# Patient Record
Sex: FEMALE | Race: BLACK OR AFRICAN AMERICAN | NOT HISPANIC OR LATINO | Employment: FULL TIME
[De-identification: names, ages, dates, MRNs, and addresses within clinical notes are randomized per-mention and may not be internally consistent; named-entity substitution may affect disease eponyms.]

---

## 2018-05-14 ENCOUNTER — CHARTING TRANS (OUTPATIENT)
Dept: OTHER | Age: 38
End: 2018-05-14

## 2018-05-14 ASSESSMENT — PAIN SCALES - GENERAL: PAINLEVEL_OUTOF10: 3

## 2018-08-01 ENCOUNTER — IMAGING SERVICES (OUTPATIENT)
Dept: OTHER | Age: 38
End: 2018-08-01

## 2018-09-13 ENCOUNTER — ANCILLARY ORDERS (OUTPATIENT)
Dept: OTHER | Age: 38
End: 2018-09-13

## 2018-09-13 ENCOUNTER — CHARTING TRANS (OUTPATIENT)
Dept: OTHER | Age: 38
End: 2018-09-13

## 2018-09-13 DIAGNOSIS — M54.16 RADICULOPATHY OF LUMBAR REGION: ICD-10-CM

## 2018-09-14 ENCOUNTER — CHARTING TRANS (OUTPATIENT)
Dept: OTHER | Age: 38
End: 2018-09-14

## 2018-11-01 VITALS
TEMPERATURE: 99.6 F | HEART RATE: 100 BPM | BODY MASS INDEX: 35.61 KG/M2 | WEIGHT: 200.99 LBS | HEIGHT: 63 IN | RESPIRATION RATE: 18 BRPM

## 2018-11-23 ENCOUNTER — IMAGING SERVICES (OUTPATIENT)
Dept: OTHER | Age: 38
End: 2018-11-23

## 2018-12-01 DIAGNOSIS — I83.892 VARICOSE VEINS OF LEFT LEG WITH EDEMA: Primary | ICD-10-CM

## 2018-12-14 ENCOUNTER — APPOINTMENT (OUTPATIENT)
Dept: CARDIOLOGY | Age: 38
End: 2018-12-14
Attending: INTERNAL MEDICINE

## 2018-12-15 ENCOUNTER — TELEPHONE (OUTPATIENT)
Dept: CARDIOLOGY | Age: 38
End: 2018-12-15

## 2018-12-21 ENCOUNTER — TELEPHONE (OUTPATIENT)
Dept: CARDIOLOGY | Age: 38
End: 2018-12-21

## 2018-12-24 ENCOUNTER — APPOINTMENT (OUTPATIENT)
Dept: CARDIOLOGY | Age: 38
End: 2018-12-24
Attending: INTERNAL MEDICINE

## 2018-12-26 ENCOUNTER — ANCILLARY PROCEDURE (OUTPATIENT)
Dept: CARDIOLOGY | Age: 38
End: 2018-12-26
Attending: INTERNAL MEDICINE

## 2018-12-26 DIAGNOSIS — I83.892 VARICOSE VEINS OF LEFT LEG WITH EDEMA: ICD-10-CM

## 2018-12-26 PROCEDURE — 93971 EXTREMITY STUDY: CPT | Performed by: SURGERY

## 2019-01-10 ENCOUNTER — TELEPHONE (OUTPATIENT)
Dept: CARDIOLOGY | Age: 39
End: 2019-01-10

## 2019-02-12 RX ORDER — AMOXICILLIN 250 MG
CAPSULE ORAL
COMMUNITY
Start: 2018-11-09 | End: 2019-02-15 | Stop reason: ALTCHOICE

## 2019-02-12 RX ORDER — MULTIVITAMIN
CAPSULE ORAL
COMMUNITY
End: 2019-02-15 | Stop reason: ALTCHOICE

## 2019-02-12 RX ORDER — VALACYCLOVIR HYDROCHLORIDE 1 G/1
TABLET, FILM COATED ORAL EVERY 12 HOURS SCHEDULED
COMMUNITY
Start: 2018-05-14 | End: 2019-02-15 | Stop reason: ALTCHOICE

## 2019-02-12 RX ORDER — ACETAMINOPHEN AND CODEINE PHOSPHATE 120; 12 MG/5ML; MG/5ML
SOLUTION ORAL
COMMUNITY
Start: 2018-11-09 | End: 2019-02-15 | Stop reason: ALTCHOICE

## 2019-02-12 RX ORDER — LABETALOL 100 MG/1
100 TABLET, FILM COATED ORAL
COMMUNITY
End: 2019-02-15 | Stop reason: ALTCHOICE

## 2019-02-15 ENCOUNTER — OFFICE VISIT (OUTPATIENT)
Dept: CARDIOLOGY | Age: 39
End: 2019-02-15

## 2019-02-15 VITALS
WEIGHT: 178.8 LBS | BODY MASS INDEX: 31.68 KG/M2 | SYSTOLIC BLOOD PRESSURE: 122 MMHG | DIASTOLIC BLOOD PRESSURE: 80 MMHG | RESPIRATION RATE: 16 BRPM | OXYGEN SATURATION: 99 % | HEIGHT: 63 IN | HEART RATE: 80 BPM

## 2019-02-15 DIAGNOSIS — I83.812 VARICOSE VEINS OF LEFT LOWER EXTREMITY WITH PAIN: Primary | ICD-10-CM

## 2019-02-15 DIAGNOSIS — I87.2 VENOUS INSUFFICIENCY: ICD-10-CM

## 2019-02-15 PROCEDURE — 99214 OFFICE O/P EST MOD 30 MIN: CPT | Performed by: INTERNAL MEDICINE

## 2019-02-19 PROBLEM — I83.812 VARICOSE VEINS OF LEFT LOWER EXTREMITY WITH PAIN: Status: ACTIVE | Noted: 2019-02-19

## 2019-02-19 PROBLEM — I87.2 VENOUS INSUFFICIENCY: Status: ACTIVE | Noted: 2019-02-19

## 2019-02-25 ENCOUNTER — TELEPHONE (OUTPATIENT)
Dept: CARDIOLOGY | Age: 39
End: 2019-02-25

## 2019-03-05 VITALS
BODY MASS INDEX: 33.31 KG/M2 | WEIGHT: 188 LBS | TEMPERATURE: 97.7 F | DIASTOLIC BLOOD PRESSURE: 70 MMHG | HEIGHT: 63 IN | SYSTOLIC BLOOD PRESSURE: 124 MMHG | HEART RATE: 92 BPM

## 2019-03-11 ENCOUNTER — ANCILLARY PROCEDURE (OUTPATIENT)
Dept: CARDIOLOGY | Age: 39
End: 2019-03-11
Attending: INTERNAL MEDICINE

## 2019-03-11 VITALS
BODY MASS INDEX: 32.43 KG/M2 | DIASTOLIC BLOOD PRESSURE: 83 MMHG | HEIGHT: 63 IN | HEART RATE: 83 BPM | OXYGEN SATURATION: 100 % | RESPIRATION RATE: 16 BRPM | SYSTOLIC BLOOD PRESSURE: 152 MMHG | WEIGHT: 183 LBS | TEMPERATURE: 98.8 F

## 2019-03-11 DIAGNOSIS — I87.2 VENOUS INSUFFICIENCY: ICD-10-CM

## 2019-03-11 PROCEDURE — 36475 ENDOVENOUS RF 1ST VEIN: CPT | Performed by: INTERNAL MEDICINE

## 2019-03-11 PROCEDURE — 96374 THER/PROPH/DIAG INJ IV PUSH: CPT | Performed by: INTERNAL MEDICINE

## 2019-03-11 RX ORDER — DIAZEPAM 2 MG/1
10 TABLET ORAL ONCE
Status: COMPLETED | OUTPATIENT
Start: 2019-03-11 | End: 2019-03-11

## 2019-03-11 RX ADMIN — DIAZEPAM 10 MG: 2 TABLET ORAL at 11:30

## 2019-03-11 RX ADMIN — Medication 50 MCG: at 11:50

## 2019-03-14 ENCOUNTER — ANCILLARY PROCEDURE (OUTPATIENT)
Dept: CARDIOLOGY | Age: 39
End: 2019-03-14
Attending: INTERNAL MEDICINE

## 2019-03-14 DIAGNOSIS — I87.2 VENOUS INSUFFICIENCY: ICD-10-CM

## 2019-03-29 ENCOUNTER — APPOINTMENT (OUTPATIENT)
Dept: CARDIOLOGY | Age: 39
End: 2019-03-29
Attending: INTERNAL MEDICINE

## 2019-03-29 VITALS
HEIGHT: 63 IN | DIASTOLIC BLOOD PRESSURE: 78 MMHG | WEIGHT: 178 LBS | OXYGEN SATURATION: 99 % | HEART RATE: 96 BPM | SYSTOLIC BLOOD PRESSURE: 132 MMHG | BODY MASS INDEX: 31.54 KG/M2 | RESPIRATION RATE: 16 BRPM

## 2019-03-29 DIAGNOSIS — I87.2 PERIPHERAL VENOUS INSUFFICIENCY: ICD-10-CM

## 2019-03-29 PROCEDURE — 36471 NJX SCLRSNT MLT INCMPTNT VN: CPT | Performed by: INTERNAL MEDICINE

## 2019-06-06 ENCOUNTER — TELEPHONE (OUTPATIENT)
Dept: INTERNAL MEDICINE | Age: 39
End: 2019-06-06

## 2019-06-06 DIAGNOSIS — B00.1 HERPES SIMPLEX LABIALIS: Primary | ICD-10-CM

## 2019-06-06 RX ORDER — VALACYCLOVIR HYDROCHLORIDE 1 G/1
TABLET, FILM COATED ORAL
Qty: 20 TABLET | Refills: 0 | Status: SHIPPED | OUTPATIENT
Start: 2019-06-06 | End: 2019-06-18 | Stop reason: ALTCHOICE

## 2019-06-08 ENCOUNTER — LAB SERVICES (OUTPATIENT)
Dept: FAMILY MEDICINE | Age: 39
End: 2019-06-08

## 2019-06-18 ENCOUNTER — OFFICE VISIT (OUTPATIENT)
Dept: CARDIOLOGY | Age: 39
End: 2019-06-18

## 2019-06-18 VITALS
WEIGHT: 180.2 LBS | OXYGEN SATURATION: 98 % | SYSTOLIC BLOOD PRESSURE: 110 MMHG | DIASTOLIC BLOOD PRESSURE: 83 MMHG | HEIGHT: 63 IN | BODY MASS INDEX: 31.93 KG/M2 | HEART RATE: 79 BPM | RESPIRATION RATE: 16 BRPM

## 2019-06-18 DIAGNOSIS — I83.812 VARICOSE VEINS OF LEFT LOWER EXTREMITY WITH PAIN: Primary | ICD-10-CM

## 2019-06-18 DIAGNOSIS — I87.2 VENOUS INSUFFICIENCY: ICD-10-CM

## 2019-06-18 PROCEDURE — 99213 OFFICE O/P EST LOW 20 MIN: CPT | Performed by: INTERNAL MEDICINE

## 2020-06-15 ENCOUNTER — TELEPHONE (OUTPATIENT)
Dept: CARDIOLOGY | Age: 40
End: 2020-06-15

## 2020-06-19 ENCOUNTER — APPOINTMENT (OUTPATIENT)
Dept: CARDIOLOGY | Age: 40
End: 2020-06-19

## 2021-09-14 ENCOUNTER — OFFICE VISIT (OUTPATIENT)
Dept: INTERNAL MEDICINE | Age: 41
End: 2021-09-14

## 2021-09-14 VITALS
HEIGHT: 63 IN | OXYGEN SATURATION: 97 % | DIASTOLIC BLOOD PRESSURE: 92 MMHG | BODY MASS INDEX: 39.69 KG/M2 | SYSTOLIC BLOOD PRESSURE: 124 MMHG | HEART RATE: 89 BPM | TEMPERATURE: 97.6 F | WEIGHT: 224 LBS | RESPIRATION RATE: 20 BRPM

## 2021-09-14 DIAGNOSIS — Z12.31 VISIT FOR SCREENING MAMMOGRAM: ICD-10-CM

## 2021-09-14 DIAGNOSIS — Z00.00 ROUTINE GENERAL MEDICAL EXAMINATION AT A HEALTH CARE FACILITY: Primary | ICD-10-CM

## 2021-09-14 DIAGNOSIS — Z83.3 FAMILY HISTORY OF DIABETES MELLITUS: ICD-10-CM

## 2021-09-14 DIAGNOSIS — Z12.12 SCREENING FOR MALIGNANT NEOPLASM OF THE RECTUM: ICD-10-CM

## 2021-09-14 DIAGNOSIS — R07.2 PRECORDIAL PAIN: ICD-10-CM

## 2021-09-14 DIAGNOSIS — R73.01 IFG (IMPAIRED FASTING GLUCOSE): ICD-10-CM

## 2021-09-14 PROBLEM — B00.1 HERPES SIMPLEX LABIALIS: Status: ACTIVE | Noted: 2018-05-14

## 2021-09-14 PROCEDURE — 93000 ELECTROCARDIOGRAM COMPLETE: CPT | Performed by: INTERNAL MEDICINE

## 2021-09-14 PROCEDURE — 99386 PREV VISIT NEW AGE 40-64: CPT | Performed by: INTERNAL MEDICINE

## 2021-09-14 PROCEDURE — 3074F SYST BP LT 130 MM HG: CPT | Performed by: INTERNAL MEDICINE

## 2021-09-14 PROCEDURE — 3080F DIAST BP >= 90 MM HG: CPT | Performed by: INTERNAL MEDICINE

## 2021-09-14 ASSESSMENT — PATIENT HEALTH QUESTIONNAIRE - PHQ9
CLINICAL INTERPRETATION OF PHQ2 SCORE: NO FURTHER SCREENING NEEDED
SUM OF ALL RESPONSES TO PHQ9 QUESTIONS 1 AND 2: 0
SUM OF ALL RESPONSES TO PHQ9 QUESTIONS 1 AND 2: 0
CLINICAL INTERPRETATION OF PHQ9 SCORE: NO FURTHER SCREENING NEEDED
1. LITTLE INTEREST OR PLEASURE IN DOING THINGS: NOT AT ALL
2. FEELING DOWN, DEPRESSED OR HOPELESS: NOT AT ALL

## 2021-09-15 DIAGNOSIS — Z01.812 PRE-PROCEDURAL LABORATORY EXAMINATION: Primary | ICD-10-CM

## 2021-09-20 ENCOUNTER — TELEPHONE (OUTPATIENT)
Dept: INTERNAL MEDICINE | Age: 41
End: 2021-09-20

## 2021-09-20 DIAGNOSIS — B00.1 HERPES SIMPLEX LABIALIS: ICD-10-CM

## 2021-09-22 ENCOUNTER — TELEPHONE (OUTPATIENT)
Dept: INTERNAL MEDICINE | Age: 41
End: 2021-09-22

## 2021-09-22 RX ORDER — VALACYCLOVIR HYDROCHLORIDE 1 G/1
TABLET, FILM COATED ORAL
Qty: 20 TABLET | Refills: 0 | Status: SHIPPED | OUTPATIENT
Start: 2021-09-22 | End: 2022-10-07 | Stop reason: SDUPTHER

## 2021-09-28 ENCOUNTER — LAB SERVICES (OUTPATIENT)
Dept: LAB | Age: 41
End: 2021-09-28

## 2021-09-28 DIAGNOSIS — Z01.812 PRE-PROCEDURAL LABORATORY EXAMINATION: ICD-10-CM

## 2021-09-28 PROCEDURE — U0005 INFEC AGEN DETEC AMPLI PROBE: HCPCS | Performed by: INTERNAL MEDICINE

## 2021-09-28 PROCEDURE — U0003 INFECTIOUS AGENT DETECTION BY NUCLEIC ACID (DNA OR RNA); SEVERE ACUTE RESPIRATORY SYNDROME CORONAVIRUS 2 (SARS-COV-2) (CORONAVIRUS DISEASE [COVID-19]), AMPLIFIED PROBE TECHNIQUE, MAKING USE OF HIGH THROUGHPUT TECHNOLOGIES AS DESCRIBED BY CMS-2020-01-R: HCPCS | Performed by: INTERNAL MEDICINE

## 2021-09-29 LAB
SARS-COV-2 RNA RESP QL NAA+PROBE: NOT DETECTED
SERVICE CMNT-IMP: NORMAL
SERVICE CMNT-IMP: NORMAL

## 2021-09-30 ENCOUNTER — LAB SERVICES (OUTPATIENT)
Dept: LAB | Age: 41
End: 2021-09-30

## 2021-09-30 ENCOUNTER — ANCILLARY PROCEDURE (OUTPATIENT)
Dept: GENERAL RADIOLOGY | Age: 41
End: 2021-09-30
Attending: INTERNAL MEDICINE

## 2021-09-30 ENCOUNTER — ANCILLARY PROCEDURE (OUTPATIENT)
Dept: CARDIOLOGY | Age: 41
End: 2021-09-30
Attending: INTERNAL MEDICINE

## 2021-09-30 DIAGNOSIS — R73.01 IFG (IMPAIRED FASTING GLUCOSE): ICD-10-CM

## 2021-09-30 DIAGNOSIS — Z00.00 ROUTINE GENERAL MEDICAL EXAMINATION AT A HEALTH CARE FACILITY: ICD-10-CM

## 2021-09-30 DIAGNOSIS — Z83.3 FAMILY HISTORY OF DIABETES MELLITUS: ICD-10-CM

## 2021-09-30 DIAGNOSIS — R07.2 PRECORDIAL PAIN: ICD-10-CM

## 2021-09-30 LAB
ALBUMIN SERPL BCG-MCNC: 4.4 G/DL (ref 3.6–5.1)
ALP SERPL-CCNC: 56 U/L (ref 45–130)
ALT SERPL W/O P-5'-P-CCNC: 28 U/L (ref 7–34)
AST SERPL-CCNC: 25 U/L (ref 9–37)
BILIRUB SERPL-MCNC: 0.5 MG/DL (ref 0–1)
BUN SERPL-MCNC: 9 MG/DL (ref 7–20)
CALCIUM SERPL-MCNC: 10.7 MG/DL (ref 8.6–10.6)
CHLORIDE SERPL-SCNC: 100 MMOL/L (ref 96–107)
CHOLEST SERPL-MCNC: 206 MG/DL (ref 140–200)
CREAT SERPL-MCNC: 0.7 MG/DL (ref 0.5–1.4)
EST. AVERAGE GLUCOSE BLD GHB EST-MCNC: 135 MG/DL (ref 0–154)
GFR SERPL CREATININE-BSD FRML MDRD: >60 ML/MIN/{1.73M2}
GFR SERPL CREATININE-BSD FRML MDRD: >60 ML/MIN/{1.73M2}
GLUCOSE P FAST SERPL-MCNC: 107 MG/DL (ref 60–100)
HBA1C MFR BLD: 6.3 % (ref 4.2–6)
HCO3 SERPL-SCNC: 23 MMOL/L (ref 22–32)
HDLC SERPL-MCNC: 39 MG/DL
LDLC SERPL CALC-MCNC: 138 MG/DL (ref 0–100)
POTASSIUM SERPL-SCNC: 4.6 MMOL/L (ref 3.5–5.3)
PROT SERPL-MCNC: 7.2 G/DL (ref 6.2–8.1)
SODIUM SERPL-SCNC: 135 MMOL/L (ref 136–146)
TRIGL SERPL-MCNC: 143 MG/DL (ref 0–200)
TSH SERPL DL<=0.05 MIU/L-ACNC: 1.15 M[IU]/L (ref 0.3–4.82)

## 2021-09-30 PROCEDURE — X1094 NO CHARGE VISIT: HCPCS

## 2021-09-30 PROCEDURE — 80053 COMPREHEN METABOLIC PANEL: CPT | Performed by: INTERNAL MEDICINE

## 2021-09-30 PROCEDURE — 84443 ASSAY THYROID STIM HORMONE: CPT | Performed by: INTERNAL MEDICINE

## 2021-09-30 PROCEDURE — 78452 HT MUSCLE IMAGE SPECT MULT: CPT | Performed by: INTERNAL MEDICINE

## 2021-09-30 PROCEDURE — 36415 COLL VENOUS BLD VENIPUNCTURE: CPT | Performed by: INTERNAL MEDICINE

## 2021-09-30 PROCEDURE — 93015 CV STRESS TEST SUPVJ I&R: CPT | Performed by: INTERNAL MEDICINE

## 2021-09-30 PROCEDURE — A9500 TC99M SESTAMIBI: HCPCS | Performed by: INTERNAL MEDICINE

## 2021-09-30 PROCEDURE — G1004 CDSM NDSC: HCPCS | Performed by: INTERNAL MEDICINE

## 2021-09-30 PROCEDURE — 80061 LIPID PANEL: CPT | Performed by: INTERNAL MEDICINE

## 2021-09-30 PROCEDURE — 83036 HEMOGLOBIN GLYCOSYLATED A1C: CPT | Performed by: INTERNAL MEDICINE

## 2021-09-30 RX ORDER — TETRAKIS(2-METHOXYISOBUTYLISOCYANIDE)COPPER(I) TETRAFLUOROBORATE 1 MG/ML
30 INJECTION, POWDER, LYOPHILIZED, FOR SOLUTION INTRAVENOUS ONCE
Status: COMPLETED | OUTPATIENT
Start: 2021-09-30 | End: 2021-09-30

## 2021-09-30 RX ORDER — TETRAKIS(2-METHOXYISOBUTYLISOCYANIDE)COPPER(I) TETRAFLUOROBORATE 1 MG/ML
11 INJECTION, POWDER, LYOPHILIZED, FOR SOLUTION INTRAVENOUS ONCE
Status: COMPLETED | OUTPATIENT
Start: 2021-09-30 | End: 2021-09-30

## 2021-09-30 RX ADMIN — TETRAKIS(2-METHOXYISOBUTYLISOCYANIDE)COPPER(I) TETRAFLUOROBORATE 33 MILLICURIE: 1 INJECTION, POWDER, LYOPHILIZED, FOR SOLUTION INTRAVENOUS at 11:28

## 2021-09-30 RX ADMIN — TETRAKIS(2-METHOXYISOBUTYLISOCYANIDE)COPPER(I) TETRAFLUOROBORATE 11 MILLICURIE: 1 INJECTION, POWDER, LYOPHILIZED, FOR SOLUTION INTRAVENOUS at 09:45

## 2021-10-01 ENCOUNTER — TELEPHONE (OUTPATIENT)
Dept: CARDIOLOGY | Age: 41
End: 2021-10-01

## 2021-10-01 LAB — STRESS TARGET HR: 177 BPM

## 2021-10-06 ENCOUNTER — TELEPHONE (OUTPATIENT)
Dept: NUTRITION | Age: 41
End: 2021-10-06

## 2021-10-06 DIAGNOSIS — E78.5 ELEVATED LIPIDS: ICD-10-CM

## 2021-10-06 DIAGNOSIS — R73.01 IFG (IMPAIRED FASTING GLUCOSE): Primary | ICD-10-CM

## 2021-10-06 DIAGNOSIS — Z83.3 FAMILY HISTORY OF DIABETES MELLITUS: ICD-10-CM

## 2021-10-06 DIAGNOSIS — E78.5 HYPERLIPIDEMIA, UNSPECIFIED HYPERLIPIDEMIA TYPE: Primary | ICD-10-CM

## 2021-10-06 DIAGNOSIS — R73.01 IMPAIRED FASTING BLOOD SUGAR: ICD-10-CM

## 2021-10-11 ENCOUNTER — OFFICE VISIT (OUTPATIENT)
Dept: NUTRITION | Age: 41
End: 2021-10-11

## 2021-10-11 DIAGNOSIS — R73.03 PRE-DIABETES: Primary | ICD-10-CM

## 2021-10-11 PROCEDURE — 97802 MEDICAL NUTRITION INDIV IN: CPT | Performed by: DIETITIAN, REGISTERED

## 2022-01-06 ENCOUNTER — HOSPITAL ENCOUNTER (EMERGENCY)
Age: 42
Discharge: HOME | End: 2022-01-07
Attending: EMERGENCY MEDICINE
Payer: COMMERCIAL

## 2022-01-06 ENCOUNTER — APPOINTMENT (OUTPATIENT)
Dept: CT IMAGING | Age: 42
End: 2022-01-06
Attending: EMERGENCY MEDICINE
Payer: COMMERCIAL

## 2022-01-06 ENCOUNTER — APPOINTMENT (OUTPATIENT)
Dept: GENERAL RADIOLOGY | Age: 42
End: 2022-01-06
Attending: EMERGENCY MEDICINE
Payer: COMMERCIAL

## 2022-01-06 VITALS
HEIGHT: 63 IN | BODY MASS INDEX: 37.21 KG/M2 | DIASTOLIC BLOOD PRESSURE: 88 MMHG | SYSTOLIC BLOOD PRESSURE: 137 MMHG | WEIGHT: 210 LBS | RESPIRATION RATE: 18 BRPM | TEMPERATURE: 98 F | HEART RATE: 95 BPM | OXYGEN SATURATION: 99 %

## 2022-01-06 DIAGNOSIS — N63.0 BREAST NODULE: ICD-10-CM

## 2022-01-06 DIAGNOSIS — R07.9 CHEST PAIN OF UNCERTAIN ETIOLOGY: Primary | ICD-10-CM

## 2022-01-06 LAB
ALBUMIN SERPL-MCNC: 4.1 G/DL (ref 3.4–5)
ALBUMIN/GLOB SERPL: 1 {RATIO} (ref 1–2)
ALP LIVER SERPL-CCNC: 60 U/L
ALT SERPL-CCNC: 60 U/L
ANION GAP SERPL CALC-SCNC: 6 MMOL/L (ref 0–18)
AST SERPL-CCNC: 40 U/L (ref 15–37)
BASOPHILS # BLD AUTO: 0.03 X10(3) UL (ref 0–0.2)
BASOPHILS NFR BLD AUTO: 0.5 %
BILIRUB SERPL-MCNC: 0.4 MG/DL (ref 0.1–2)
BUN BLD-MCNC: 9 MG/DL (ref 7–18)
CALCIUM BLD-MCNC: 9.6 MG/DL (ref 8.5–10.1)
CHLORIDE SERPL-SCNC: 105 MMOL/L (ref 98–112)
CO2 SERPL-SCNC: 26 MMOL/L (ref 21–32)
CREAT BLD-MCNC: 0.79 MG/DL
D DIMER PPP FEU-MCNC: 0.59 UG/ML FEU (ref ?–0.5)
EOSINOPHIL # BLD AUTO: 0.13 X10(3) UL (ref 0–0.7)
EOSINOPHIL NFR BLD AUTO: 2.4 %
ERYTHROCYTE [DISTWIDTH] IN BLOOD BY AUTOMATED COUNT: 12 %
GLOBULIN PLAS-MCNC: 4 G/DL (ref 2.8–4.4)
GLUCOSE BLD-MCNC: 130 MG/DL (ref 70–99)
HCT VFR BLD AUTO: 44.5 %
HGB BLD-MCNC: 15.4 G/DL
IMM GRANULOCYTES # BLD AUTO: 0.02 X10(3) UL (ref 0–1)
IMM GRANULOCYTES NFR BLD: 0.4 %
LYMPHOCYTES # BLD AUTO: 3.3 X10(3) UL (ref 1–4)
LYMPHOCYTES NFR BLD AUTO: 59.7 %
MCH RBC QN AUTO: 32.1 PG (ref 26–34)
MCHC RBC AUTO-ENTMCNC: 34.6 G/DL (ref 31–37)
MCV RBC AUTO: 92.7 FL
MONOCYTES # BLD AUTO: 0.29 X10(3) UL (ref 0.1–1)
MONOCYTES NFR BLD AUTO: 5.2 %
NEUTROPHILS # BLD AUTO: 1.76 X10 (3) UL (ref 1.5–7.7)
NEUTROPHILS # BLD AUTO: 1.76 X10(3) UL (ref 1.5–7.7)
NEUTROPHILS NFR BLD AUTO: 31.8 %
OSMOLALITY SERPL CALC.SUM OF ELEC: 284 MOSM/KG (ref 275–295)
PLATELET # BLD AUTO: 266 10(3)UL (ref 150–450)
POTASSIUM SERPL-SCNC: 4.2 MMOL/L (ref 3.5–5.1)
PROT SERPL-MCNC: 8.1 G/DL (ref 6.4–8.2)
RBC # BLD AUTO: 4.8 X10(6)UL
SARS-COV-2 RNA RESP QL NAA+PROBE: NOT DETECTED
SODIUM SERPL-SCNC: 137 MMOL/L (ref 136–145)
TROPONIN I HIGH SENSITIVITY: <3 NG/L
TROPONIN I HIGH SENSITIVITY: <3 NG/L
WBC # BLD AUTO: 5.5 X10(3) UL (ref 4–11)

## 2022-01-06 PROCEDURE — 93010 ELECTROCARDIOGRAM REPORT: CPT

## 2022-01-06 PROCEDURE — 85025 COMPLETE CBC W/AUTO DIFF WBC: CPT | Performed by: EMERGENCY MEDICINE

## 2022-01-06 PROCEDURE — 85379 FIBRIN DEGRADATION QUANT: CPT | Performed by: EMERGENCY MEDICINE

## 2022-01-06 PROCEDURE — 71260 CT THORAX DX C+: CPT | Performed by: EMERGENCY MEDICINE

## 2022-01-06 PROCEDURE — 99284 EMERGENCY DEPT VISIT MOD MDM: CPT

## 2022-01-06 PROCEDURE — 99285 EMERGENCY DEPT VISIT HI MDM: CPT

## 2022-01-06 PROCEDURE — 80053 COMPREHEN METABOLIC PANEL: CPT | Performed by: EMERGENCY MEDICINE

## 2022-01-06 PROCEDURE — 93005 ELECTROCARDIOGRAM TRACING: CPT

## 2022-01-06 PROCEDURE — 84484 ASSAY OF TROPONIN QUANT: CPT | Performed by: EMERGENCY MEDICINE

## 2022-01-06 PROCEDURE — 96374 THER/PROPH/DIAG INJ IV PUSH: CPT

## 2022-01-06 PROCEDURE — 71045 X-RAY EXAM CHEST 1 VIEW: CPT | Performed by: EMERGENCY MEDICINE

## 2022-01-06 RX ORDER — KETOROLAC TROMETHAMINE 15 MG/ML
15 INJECTION, SOLUTION INTRAMUSCULAR; INTRAVENOUS ONCE
Status: COMPLETED | OUTPATIENT
Start: 2022-01-06 | End: 2022-01-06

## 2022-01-07 ENCOUNTER — TELEPHONE (OUTPATIENT)
Dept: INTERNAL MEDICINE | Age: 42
End: 2022-01-07

## 2022-01-07 LAB
ATRIAL RATE: 107 BPM
P AXIS: 64 DEGREES
P-R INTERVAL: 142 MS
Q-T INTERVAL: 344 MS
QRS DURATION: 74 MS
QTC CALCULATION (BEZET): 459 MS
R AXIS: 59 DEGREES
T AXIS: 52 DEGREES
VENTRICULAR RATE: 107 BPM

## 2022-01-07 NOTE — ED INITIAL ASSESSMENT (HPI)
Chest pain started approx 45 mins ago. Pt states worse with deep breathing. Denies n/v, light headed or dizziness, or diaphoresis. States just getting over a cold for 5 days.

## 2022-01-07 NOTE — ED PROVIDER NOTES
Patient Seen in: THE [facility] Emergency Department In [place]      History   Patient presents with:  Chest Pain Angina    Stated Complaint: chest pain    Subjective:   HPI    This is a 52-year-old woman, reports history of hypertension, here for evaluation and nursing note reviewed. General: Well-appearing younger woman in no acute distress  Head: Normocephalic and atraumatic. HEENT:  Mucous membranes are moist.   Cardiovascular:  Normal rate and regular rhythm.   No Edema  Pulmonary:  Pulmonary effort is shares she has been having chest pain for the last hour. Pain increases when she takes a deep breath. FINDINGS:  Heart size and vascularity are normal.  Lung fields are clear. No diaphragmatic or pleural abnormality is identified.   The ignacio and mediast with constant sharp central chest discomfort, nonexertional starting today. EKG unremarkable troponin negative x2, D-dimer was positive leading to a CTA of the chest that showed no acute abnormalities did demonstrate an 8 mm breast nodule.   Patient alread

## 2022-01-11 ENCOUNTER — APPOINTMENT (OUTPATIENT)
Dept: INTERNAL MEDICINE | Age: 42
End: 2022-01-11

## 2022-01-13 ENCOUNTER — OFFICE VISIT (OUTPATIENT)
Dept: INTERNAL MEDICINE | Age: 42
End: 2022-01-13

## 2022-01-13 ENCOUNTER — IMAGING SERVICES (OUTPATIENT)
Dept: GENERAL RADIOLOGY | Age: 42
End: 2022-01-13
Attending: INTERNAL MEDICINE

## 2022-01-13 ENCOUNTER — TELEPHONE (OUTPATIENT)
Dept: INTERNAL MEDICINE | Age: 42
End: 2022-01-13

## 2022-01-13 VITALS
SYSTOLIC BLOOD PRESSURE: 132 MMHG | HEART RATE: 104 BPM | BODY MASS INDEX: 38.62 KG/M2 | WEIGHT: 218 LBS | HEIGHT: 63 IN | OXYGEN SATURATION: 98 % | DIASTOLIC BLOOD PRESSURE: 92 MMHG | RESPIRATION RATE: 20 BRPM | TEMPERATURE: 99.3 F

## 2022-01-13 DIAGNOSIS — R05.8 COUGH PRODUCTIVE OF PURULENT SPUTUM: ICD-10-CM

## 2022-01-13 DIAGNOSIS — R93.6 ABNORMAL X-RAY OF KNEE: Primary | ICD-10-CM

## 2022-01-13 DIAGNOSIS — M25.561 RECURRENT PAIN OF RIGHT KNEE: ICD-10-CM

## 2022-01-13 DIAGNOSIS — K76.0 FATTY INFILTRATION OF LIVER: ICD-10-CM

## 2022-01-13 DIAGNOSIS — R07.9 CHEST PAIN AT REST: Primary | ICD-10-CM

## 2022-01-13 DIAGNOSIS — R79.89 POSITIVE D DIMER: ICD-10-CM

## 2022-01-13 DIAGNOSIS — N63.0 BREAST NODULE: ICD-10-CM

## 2022-01-13 PROCEDURE — 3080F DIAST BP >= 90 MM HG: CPT | Performed by: INTERNAL MEDICINE

## 2022-01-13 PROCEDURE — 3075F SYST BP GE 130 - 139MM HG: CPT | Performed by: INTERNAL MEDICINE

## 2022-01-13 PROCEDURE — 73564 X-RAY EXAM KNEE 4 OR MORE: CPT | Performed by: RADIOLOGY

## 2022-01-13 PROCEDURE — 99215 OFFICE O/P EST HI 40 MIN: CPT | Performed by: INTERNAL MEDICINE

## 2022-01-13 RX ORDER — AZITHROMYCIN 250 MG/1
TABLET, FILM COATED ORAL
Qty: 6 TABLET | Refills: 0 | Status: SHIPPED | OUTPATIENT
Start: 2022-01-13 | End: 2022-10-18 | Stop reason: ALTCHOICE

## 2022-01-13 RX ORDER — DEXTROMETHORPHAN HYDROBROMIDE AND PROMETHAZINE HYDROCHLORIDE 15; 6.25 MG/5ML; MG/5ML
5 SYRUP ORAL 4 TIMES DAILY PRN
Qty: 240 ML | Refills: 0 | Status: SHIPPED | OUTPATIENT
Start: 2022-01-13 | End: 2022-10-18 | Stop reason: ALTCHOICE

## 2022-01-13 ASSESSMENT — PAIN SCALES - GENERAL: PAINLEVEL: 0

## 2022-01-14 DIAGNOSIS — M89.30 BONE THICKENING: Primary | ICD-10-CM

## 2022-01-19 ENCOUNTER — TELEPHONE (OUTPATIENT)
Dept: SPORTS MEDICINE | Age: 42
End: 2022-01-19

## 2022-01-19 ENCOUNTER — OFFICE VISIT (OUTPATIENT)
Dept: SPORTS MEDICINE | Age: 42
End: 2022-01-19

## 2022-01-19 VITALS
WEIGHT: 218 LBS | BODY MASS INDEX: 38.62 KG/M2 | SYSTOLIC BLOOD PRESSURE: 138 MMHG | HEIGHT: 63 IN | HEART RATE: 90 BPM | DIASTOLIC BLOOD PRESSURE: 82 MMHG

## 2022-01-19 DIAGNOSIS — M22.2X1 PATELLOFEMORAL PAIN SYNDROME OF RIGHT KNEE: Primary | ICD-10-CM

## 2022-01-19 DIAGNOSIS — M89.9 BONE LESION: ICD-10-CM

## 2022-01-19 PROCEDURE — 3075F SYST BP GE 130 - 139MM HG: CPT | Performed by: PEDIATRICS

## 2022-01-19 PROCEDURE — 3079F DIAST BP 80-89 MM HG: CPT | Performed by: PEDIATRICS

## 2022-01-19 PROCEDURE — 99204 OFFICE O/P NEW MOD 45 MIN: CPT | Performed by: PEDIATRICS

## 2022-01-19 RX ORDER — MELOXICAM 15 MG/1
15 TABLET ORAL DAILY
Qty: 14 TABLET | Refills: 0 | Status: SHIPPED | OUTPATIENT
Start: 2022-01-19 | End: 2022-02-02

## 2022-01-25 ENCOUNTER — OFFICE VISIT (OUTPATIENT)
Dept: PHYSICAL THERAPY | Age: 42
End: 2022-01-25
Attending: PEDIATRICS

## 2022-01-25 DIAGNOSIS — M22.2X1 PATELLOFEMORAL PAIN SYNDROME OF RIGHT KNEE: Primary | ICD-10-CM

## 2022-01-25 PROCEDURE — 97112 NEUROMUSCULAR REEDUCATION: CPT | Performed by: PHYSICAL THERAPIST

## 2022-01-25 PROCEDURE — 97110 THERAPEUTIC EXERCISES: CPT | Performed by: PHYSICAL THERAPIST

## 2022-01-25 PROCEDURE — 97161 PT EVAL LOW COMPLEX 20 MIN: CPT | Performed by: PHYSICAL THERAPIST

## 2022-01-25 ASSESSMENT — ENCOUNTER SYMPTOMS
SUBJECTIVE PAIN PROGRESSION: NO CHANGE
QUALITY: ACHE
PAIN FREQUENCY: INTERMITTENT
ALLEVIATING FACTORS: AVOIDING MOVEMENT IN INVOLVED AREA
PAIN SEVERITY NOW: 0
PAIN DESCRIPTION: SEE NARRATIVE FOR MORE DETAILS
PAIN LOCATION: SEE NARRATIVE FOR MORE DETAILS
PAIN SCALE AT LOWEST: 0
ALLEVIATING FACTOR: SEE NARRATIVE FOR MORE DETAILS
PAIN SCALE AT HIGHEST: 9

## 2022-01-25 ASSESSMENT — MOVEMENT AND STRENGTH ASSESSMENTS
ROLLING OVER IN BED: NO DIFFICULTY
WALKING BETWEEN ROOMS: NO DIFFICULTY
RUNNING ON EVEN GROUND: MODERATE DIFFICULTY
STANDING FOR 1 HOUR: A LITTLE BIT OF DIFFICULTY
YOUR USUAL HOBBIES, RECREATIONAL OR SPORTING ACTIVIITIES: QUITE A BIT OF DIFFICULTY
SQUATTING: NO DIFFICULTY
ANY OF YOUR USUAL WORK, HOUSEWORK OR SCHOOL ACTIVITIES: NO DIFFICULTY
HOPPING: MODERATE DIFFICULTY
WALKING A MILE: NO DIFFICULTY
TOTAL SCORE: 80
MAKING SHARP TURNS WHILE RUNNING FAST: QUITE A BIT OF DIFFICULTY
RUNNING ON UNEVEN GROUND: MODERATE DIFFICULTY
PUTTING ON YOUR SHOES OR SOCKS: NO DIFFICULTY
GETTING INTO OR OUT OF A CAR: NO DIFFICULTY
PERFORMING LIGHT ACTIVITES AROUND YOUR HOME: NO DIFFICULTY
PERFORMING HEAVY ACTIVITIES AROUND YOUR HOME: A LITTLE BIT OF DIFFICULTY
SITTING FOR 1 HOUR: NO DIFFICULTY
GOING UP OR DOWN 10 STAIRS (ABOUT 1 FLIGHT OF STAIRS): MODERATE DIFFICULTY
GETTING INTO OR OUT OF THE BATH: NO DIFFICULTY
WALKING 2 BLOCKS: NO DIFFICULTY
LIFTING AN OBJECT, LIKE A BAG OF GROCERIES, FROM THE FLOOR: NO DIFFICULTY

## 2022-01-26 ENCOUNTER — IMAGING SERVICES (OUTPATIENT)
Dept: MAMMOGRAPHY | Age: 42
End: 2022-01-26
Attending: INTERNAL MEDICINE

## 2022-01-26 DIAGNOSIS — Z00.00 ROUTINE GENERAL MEDICAL EXAMINATION AT A HEALTH CARE FACILITY: ICD-10-CM

## 2022-01-26 DIAGNOSIS — Z12.31 VISIT FOR SCREENING MAMMOGRAM: ICD-10-CM

## 2022-02-01 ENCOUNTER — TELEPHONE (OUTPATIENT)
Dept: PHYSICAL THERAPY | Age: 42
End: 2022-02-01

## 2022-02-02 ENCOUNTER — APPOINTMENT (OUTPATIENT)
Dept: PHYSICAL THERAPY | Age: 42
End: 2022-02-02

## 2022-02-08 ENCOUNTER — LAB SERVICES (OUTPATIENT)
Dept: LAB | Age: 42
End: 2022-02-08

## 2022-02-08 ENCOUNTER — OFFICE VISIT (OUTPATIENT)
Dept: PHYSICAL THERAPY | Age: 42
End: 2022-02-08

## 2022-02-08 ENCOUNTER — APPOINTMENT (OUTPATIENT)
Dept: PHYSICAL THERAPY | Age: 42
End: 2022-02-08

## 2022-02-08 DIAGNOSIS — M22.2X1 PATELLOFEMORAL PAIN SYNDROME OF RIGHT KNEE: Primary | ICD-10-CM

## 2022-02-08 PROCEDURE — G0283 ELEC STIM OTHER THAN WOUND: HCPCS | Performed by: PHYSICAL THERAPIST

## 2022-02-08 PROCEDURE — 97140 MANUAL THERAPY 1/> REGIONS: CPT | Performed by: PHYSICAL THERAPIST

## 2022-02-08 PROCEDURE — 97112 NEUROMUSCULAR REEDUCATION: CPT | Performed by: PHYSICAL THERAPIST

## 2022-02-08 PROCEDURE — 97110 THERAPEUTIC EXERCISES: CPT | Performed by: PHYSICAL THERAPIST

## 2022-02-08 PROCEDURE — 97530 THERAPEUTIC ACTIVITIES: CPT | Performed by: PHYSICAL THERAPIST

## 2022-02-10 ENCOUNTER — TELEPHONE (OUTPATIENT)
Dept: CT IMAGING | Age: 42
End: 2022-02-10

## 2022-02-10 ENCOUNTER — IMAGING SERVICES (OUTPATIENT)
Dept: MAMMOGRAPHY | Age: 42
End: 2022-02-10
Attending: INTERNAL MEDICINE

## 2022-02-10 ENCOUNTER — IMAGING SERVICES (OUTPATIENT)
Dept: ULTRASOUND IMAGING | Age: 42
End: 2022-02-10
Attending: INTERNAL MEDICINE

## 2022-02-10 DIAGNOSIS — N63.0 BREAST NODULE: Primary | ICD-10-CM

## 2022-02-10 DIAGNOSIS — N63.0 BREAST NODULE: ICD-10-CM

## 2022-02-10 DIAGNOSIS — R92.8 ABNORMAL FINDING ON BREAST IMAGING: ICD-10-CM

## 2022-02-10 DIAGNOSIS — N63.20 MASS OF LEFT BREAST, UNSPECIFIED QUADRANT: ICD-10-CM

## 2022-02-10 PROCEDURE — 76642 ULTRASOUND BREAST LIMITED: CPT | Performed by: RADIOLOGY

## 2022-02-10 PROCEDURE — 77066 DX MAMMO INCL CAD BI: CPT | Performed by: RADIOLOGY

## 2022-02-11 ENCOUNTER — TELEPHONE (OUTPATIENT)
Dept: MAMMOGRAPHY | Age: 42
End: 2022-02-11

## 2022-02-14 ENCOUNTER — OFFICE VISIT (OUTPATIENT)
Dept: PHYSICAL THERAPY | Age: 42
End: 2022-02-14

## 2022-02-14 DIAGNOSIS — M22.2X1 PATELLOFEMORAL PAIN SYNDROME OF RIGHT KNEE: Primary | ICD-10-CM

## 2022-02-14 PROCEDURE — G0283 ELEC STIM OTHER THAN WOUND: HCPCS | Performed by: PHYSICAL THERAPIST

## 2022-02-14 PROCEDURE — 97140 MANUAL THERAPY 1/> REGIONS: CPT | Performed by: PHYSICAL THERAPIST

## 2022-02-14 PROCEDURE — 97530 THERAPEUTIC ACTIVITIES: CPT | Performed by: PHYSICAL THERAPIST

## 2022-02-14 PROCEDURE — 97112 NEUROMUSCULAR REEDUCATION: CPT | Performed by: PHYSICAL THERAPIST

## 2022-02-14 PROCEDURE — 97110 THERAPEUTIC EXERCISES: CPT | Performed by: PHYSICAL THERAPIST

## 2022-02-15 ENCOUNTER — IMAGING SERVICES (OUTPATIENT)
Dept: MRI IMAGING | Age: 42
End: 2022-02-15
Attending: PEDIATRICS

## 2022-02-15 ENCOUNTER — TELEPHONE (OUTPATIENT)
Dept: SPORTS MEDICINE | Age: 42
End: 2022-02-15

## 2022-02-15 DIAGNOSIS — M89.9 BONE LESION: Primary | ICD-10-CM

## 2022-02-15 DIAGNOSIS — M89.9 BONE LESION: ICD-10-CM

## 2022-02-15 PROCEDURE — 73721 MRI JNT OF LWR EXTRE W/O DYE: CPT | Performed by: RADIOLOGY

## 2022-02-15 PROCEDURE — G1004 CDSM NDSC: HCPCS | Performed by: RADIOLOGY

## 2022-02-16 ENCOUNTER — OFFICE VISIT (OUTPATIENT)
Dept: PHYSICAL THERAPY | Age: 42
End: 2022-02-16

## 2022-02-16 ENCOUNTER — TELEPHONE (OUTPATIENT)
Dept: ORTHOPEDICS | Age: 42
End: 2022-02-16

## 2022-02-16 DIAGNOSIS — M22.2X1 PATELLOFEMORAL PAIN SYNDROME OF RIGHT KNEE: Primary | ICD-10-CM

## 2022-02-16 PROCEDURE — 97110 THERAPEUTIC EXERCISES: CPT | Performed by: PHYSICAL THERAPIST

## 2022-02-16 PROCEDURE — 97140 MANUAL THERAPY 1/> REGIONS: CPT | Performed by: PHYSICAL THERAPIST

## 2022-02-16 PROCEDURE — 97530 THERAPEUTIC ACTIVITIES: CPT | Performed by: PHYSICAL THERAPIST

## 2022-02-16 PROCEDURE — G0283 ELEC STIM OTHER THAN WOUND: HCPCS | Performed by: PHYSICAL THERAPIST

## 2022-02-16 PROCEDURE — 97112 NEUROMUSCULAR REEDUCATION: CPT | Performed by: PHYSICAL THERAPIST

## 2022-02-21 ENCOUNTER — IMAGING SERVICES (OUTPATIENT)
Dept: MAMMOGRAPHY | Age: 42
End: 2022-02-21
Attending: RADIOLOGY

## 2022-02-21 ENCOUNTER — IMAGING SERVICES (OUTPATIENT)
Dept: ULTRASOUND IMAGING | Age: 42
End: 2022-02-21
Attending: RADIOLOGY

## 2022-02-21 ENCOUNTER — OFFICE VISIT (OUTPATIENT)
Dept: PHYSICAL THERAPY | Age: 42
End: 2022-02-21

## 2022-02-21 DIAGNOSIS — N63.20 UNSPECIFIED LUMP IN THE LEFT BREAST, UNSPECIFIED QUADRANT: ICD-10-CM

## 2022-02-21 DIAGNOSIS — N63.20 MASS OF LEFT BREAST, UNSPECIFIED QUADRANT: ICD-10-CM

## 2022-02-21 DIAGNOSIS — M22.2X1 PATELLOFEMORAL PAIN SYNDROME OF RIGHT KNEE: Primary | ICD-10-CM

## 2022-02-21 PROCEDURE — G0283 ELEC STIM OTHER THAN WOUND: HCPCS | Performed by: PHYSICAL THERAPIST

## 2022-02-21 PROCEDURE — 97110 THERAPEUTIC EXERCISES: CPT | Performed by: PHYSICAL THERAPIST

## 2022-02-21 PROCEDURE — 88305 TISSUE EXAM BY PATHOLOGIST: CPT | Performed by: PATHOLOGY

## 2022-02-21 PROCEDURE — 19083 BX BREAST 1ST LESION US IMAG: CPT | Performed by: RADIOLOGY

## 2022-02-21 PROCEDURE — 97112 NEUROMUSCULAR REEDUCATION: CPT | Performed by: PHYSICAL THERAPIST

## 2022-02-21 PROCEDURE — 77065 DX MAMMO INCL CAD UNI: CPT | Performed by: RADIOLOGY

## 2022-02-21 PROCEDURE — 97530 THERAPEUTIC ACTIVITIES: CPT | Performed by: PHYSICAL THERAPIST

## 2022-02-21 RX ORDER — LIDOCAINE HYDROCHLORIDE 10 MG/ML
10 INJECTION, SOLUTION INFILTRATION; PERINEURAL ONCE
Status: SHIPPED | OUTPATIENT
Start: 2022-02-21

## 2022-02-22 ENCOUNTER — LAB REQUISITION (OUTPATIENT)
Dept: LAB | Age: 42
End: 2022-02-22

## 2022-02-22 ENCOUNTER — IMAGING SERVICES (OUTPATIENT)
Dept: MRI IMAGING | Age: 42
End: 2022-02-22
Attending: PEDIATRICS

## 2022-02-22 DIAGNOSIS — N63.20 UNSPECIFIED LUMP IN THE LEFT BREAST, UNSPECIFIED QUADRANT: ICD-10-CM

## 2022-02-22 DIAGNOSIS — M89.9 BONE LESION: ICD-10-CM

## 2022-02-22 PROCEDURE — X1094 NO CHARGE VISIT: HCPCS | Performed by: RADIOLOGY

## 2022-02-22 PROCEDURE — G1004 CDSM NDSC: HCPCS | Performed by: RADIOLOGY

## 2022-02-22 PROCEDURE — A9585 GADOBUTROL INJECTION: HCPCS | Performed by: RADIOLOGY

## 2022-02-22 PROCEDURE — 88305 TISSUE EXAM BY PATHOLOGIST: CPT | Performed by: CLINICAL MEDICAL LABORATORY

## 2022-02-22 PROCEDURE — 73722 MRI JOINT OF LWR EXTR W/DYE: CPT | Performed by: RADIOLOGY

## 2022-02-22 RX ORDER — GADOBUTROL 604.72 MG/ML
10 INJECTION INTRAVENOUS ONCE
Status: COMPLETED | OUTPATIENT
Start: 2022-02-22 | End: 2022-02-22

## 2022-02-22 RX ADMIN — GADOBUTROL 10 ML: 604.72 INJECTION INTRAVENOUS at 13:34

## 2022-02-23 ENCOUNTER — OFFICE VISIT (OUTPATIENT)
Dept: PHYSICAL THERAPY | Age: 42
End: 2022-02-23

## 2022-02-23 DIAGNOSIS — M22.2X1 PATELLOFEMORAL PAIN SYNDROME OF RIGHT KNEE: Primary | ICD-10-CM

## 2022-02-23 PROCEDURE — 97110 THERAPEUTIC EXERCISES: CPT | Performed by: PHYSICAL THERAPIST

## 2022-02-23 PROCEDURE — 97112 NEUROMUSCULAR REEDUCATION: CPT | Performed by: PHYSICAL THERAPIST

## 2022-02-23 ASSESSMENT — ENCOUNTER SYMPTOMS
PAIN SEVERITY NOW: 0
PAIN SCALE AT HIGHEST: 2
PAIN SCALE AT LOWEST: 0

## 2022-02-23 ASSESSMENT — MOVEMENT AND STRENGTH ASSESSMENTS
WALKING A MILE: NO DIFFICULTY
YOUR USUAL HOBBIES, RECREATIONAL OR SPORTING ACTIVIITIES: A LITTLE BIT OF DIFFICULTY
SITTING FOR 1 HOUR: NO DIFFICULTY
TOTAL SCORE: 92.5
PERFORMING LIGHT ACTIVITES AROUND YOUR HOME: NO DIFFICULTY
ROLLING OVER IN BED: NO DIFFICULTY
RUNNING ON UNEVEN GROUND: A LITTLE BIT OF DIFFICULTY
LIFTING AN OBJECT, LIKE A BAG OF GROCERIES, FROM THE FLOOR: NO DIFFICULTY
STANDING FOR 1 HOUR: NO DIFFICULTY
PERFORMING HEAVY ACTIVITIES AROUND YOUR HOME: NO DIFFICULTY
GETTING INTO OR OUT OF A CAR: NO DIFFICULTY
WALKING 2 BLOCKS: NO DIFFICULTY
ANY OF YOUR USUAL WORK, HOUSEWORK OR SCHOOL ACTIVITIES: NO DIFFICULTY
GETTING INTO OR OUT OF THE BATH: NO DIFFICULTY
WALKING BETWEEN ROOMS: NO DIFFICULTY
GOING UP OR DOWN 10 STAIRS (ABOUT 1 FLIGHT OF STAIRS): MODERATE DIFFICULTY
MAKING SHARP TURNS WHILE RUNNING FAST: A LITTLE BIT OF DIFFICULTY
SQUATTING: A LITTLE BIT OF DIFFICULTY
RUNNING ON EVEN GROUND: NO DIFFICULTY
PUTTING ON YOUR SHOES OR SOCKS: NO DIFFICULTY
HOPPING: NO DIFFICULTY

## 2022-02-24 DIAGNOSIS — N63.20 MASS OF LEFT BREAST, UNSPECIFIED QUADRANT: Primary | ICD-10-CM

## 2022-02-24 DIAGNOSIS — N63.0 BREAST NODULE: ICD-10-CM

## 2022-02-24 LAB — AP REPORT: NORMAL

## 2022-03-16 ENCOUNTER — APPOINTMENT (OUTPATIENT)
Dept: SPORTS MEDICINE | Age: 42
End: 2022-03-16

## 2022-03-17 ENCOUNTER — OFFICE VISIT (OUTPATIENT)
Dept: SURGERY | Age: 42
End: 2022-03-17
Attending: INTERNAL MEDICINE

## 2022-03-17 VITALS — HEIGHT: 63 IN | WEIGHT: 218 LBS | BODY MASS INDEX: 38.62 KG/M2

## 2022-03-17 DIAGNOSIS — R92.8 ABNORMAL MAMMOGRAM: ICD-10-CM

## 2022-03-17 DIAGNOSIS — D48.62 NEOPLASM OF UNCERTAIN BEHAVIOR OF LEFT BREAST: ICD-10-CM

## 2022-03-17 DIAGNOSIS — R92.2 INCONCLUSIVE MAMMOGRAPHY DUE TO DENSE BREASTS: Primary | ICD-10-CM

## 2022-03-17 DIAGNOSIS — D24.9 FIBROADENOMA OF BREAST, UNSPECIFIED LATERALITY: ICD-10-CM

## 2022-03-17 DIAGNOSIS — R92.30 INCONCLUSIVE MAMMOGRAPHY DUE TO DENSE BREASTS: Primary | ICD-10-CM

## 2022-03-17 PROCEDURE — 99203 OFFICE O/P NEW LOW 30 MIN: CPT | Performed by: SURGERY

## 2022-03-26 LAB
CASE RPRT: NORMAL
PATH REPORT.FINAL DX SPEC: NORMAL

## 2022-04-18 ENCOUNTER — APPOINTMENT (OUTPATIENT)
Dept: MRI IMAGING | Age: 42
End: 2022-04-18
Attending: SURGERY

## 2022-10-07 DIAGNOSIS — B00.1 HERPES SIMPLEX LABIALIS: ICD-10-CM

## 2022-10-07 RX ORDER — VALACYCLOVIR HYDROCHLORIDE 1 G/1
TABLET, FILM COATED ORAL
Qty: 20 TABLET | Refills: 0 | Status: SHIPPED | OUTPATIENT
Start: 2022-10-07

## 2022-10-18 ENCOUNTER — LAB SERVICES (OUTPATIENT)
Dept: LAB | Age: 42
End: 2022-10-18

## 2022-10-18 ENCOUNTER — OFFICE VISIT (OUTPATIENT)
Dept: INTERNAL MEDICINE | Age: 42
End: 2022-10-18

## 2022-10-18 VITALS
WEIGHT: 220 LBS | OXYGEN SATURATION: 96 % | TEMPERATURE: 97.4 F | BODY MASS INDEX: 38.98 KG/M2 | HEART RATE: 68 BPM | RESPIRATION RATE: 17 BRPM | HEIGHT: 63 IN | DIASTOLIC BLOOD PRESSURE: 110 MMHG | SYSTOLIC BLOOD PRESSURE: 150 MMHG

## 2022-10-18 DIAGNOSIS — I10 PRIMARY HYPERTENSION: Primary | ICD-10-CM

## 2022-10-18 DIAGNOSIS — I10 PRIMARY HYPERTENSION: ICD-10-CM

## 2022-10-18 DIAGNOSIS — Z23 NEED FOR DIPHTHERIA-TETANUS-PERTUSSIS (TDAP) VACCINE: ICD-10-CM

## 2022-10-18 PROBLEM — R05.8 COUGH PRODUCTIVE OF PURULENT SPUTUM: Status: RESOLVED | Noted: 2022-01-13 | Resolved: 2022-10-18

## 2022-10-18 PROCEDURE — 3080F DIAST BP >= 90 MM HG: CPT | Performed by: INTERNAL MEDICINE

## 2022-10-18 PROCEDURE — 36415 COLL VENOUS BLD VENIPUNCTURE: CPT | Performed by: INTERNAL MEDICINE

## 2022-10-18 PROCEDURE — 80048 BASIC METABOLIC PNL TOTAL CA: CPT | Performed by: INTERNAL MEDICINE

## 2022-10-18 PROCEDURE — 3077F SYST BP >= 140 MM HG: CPT | Performed by: INTERNAL MEDICINE

## 2022-10-18 PROCEDURE — 90715 TDAP VACCINE 7 YRS/> IM: CPT | Performed by: INTERNAL MEDICINE

## 2022-10-18 PROCEDURE — 99214 OFFICE O/P EST MOD 30 MIN: CPT | Performed by: INTERNAL MEDICINE

## 2022-10-18 PROCEDURE — 90471 IMMUNIZATION ADMIN: CPT | Performed by: INTERNAL MEDICINE

## 2022-10-18 RX ORDER — LABETALOL 100 MG/1
100 TABLET, FILM COATED ORAL 2 TIMES DAILY
COMMUNITY
End: 2022-10-18 | Stop reason: ALTCHOICE

## 2022-10-18 ASSESSMENT — PATIENT HEALTH QUESTIONNAIRE - PHQ9
SUM OF ALL RESPONSES TO PHQ9 QUESTIONS 1 AND 2: 0
SUM OF ALL RESPONSES TO PHQ9 QUESTIONS 1 AND 2: 0
CLINICAL INTERPRETATION OF PHQ2 SCORE: NO FURTHER SCREENING NEEDED
1. LITTLE INTEREST OR PLEASURE IN DOING THINGS: NOT AT ALL
2. FEELING DOWN, DEPRESSED OR HOPELESS: NOT AT ALL

## 2022-10-19 DIAGNOSIS — I10 ESSENTIAL (PRIMARY) HYPERTENSION: Primary | ICD-10-CM

## 2022-10-19 LAB
BUN SERPL-MCNC: 9 MG/DL (ref 7–20)
CALCIUM SERPL-MCNC: 9.7 MG/DL (ref 8.6–10.6)
CHLORIDE SERPL-SCNC: 108 MMOL/L (ref 96–107)
CO2 SERPL-SCNC: 25 MMOL/L (ref 22–32)
CREAT SERPL-MCNC: 0.7 MG/DL (ref 0.5–1.4)
GFR SERPL CREATININE-BSD FRML MDRD: >60 ML/MIN/{1.73M2}
GFR SERPL CREATININE-BSD FRML MDRD: >60 ML/MIN/{1.73M2}
GLUCOSE SERPL-MCNC: 140 MG/DL (ref 70–200)
POTASSIUM SERPL-SCNC: 4.2 MMOL/L (ref 3.5–5.3)
SODIUM SERPL-SCNC: 141 MMOL/L (ref 136–146)

## 2022-10-19 RX ORDER — LISINOPRIL 10 MG/1
10 TABLET ORAL DAILY
Qty: 90 TABLET | Refills: 3 | Status: SHIPPED | OUTPATIENT
Start: 2022-10-19 | End: 2022-11-01 | Stop reason: DRUGHIGH

## 2022-10-25 ENCOUNTER — OFFICE VISIT (OUTPATIENT)
Dept: INTERNAL MEDICINE | Age: 42
End: 2022-10-25

## 2022-10-25 ENCOUNTER — LAB SERVICES (OUTPATIENT)
Dept: LAB | Age: 42
End: 2022-10-25

## 2022-10-25 VITALS
RESPIRATION RATE: 20 BRPM | DIASTOLIC BLOOD PRESSURE: 104 MMHG | HEART RATE: 90 BPM | WEIGHT: 218 LBS | TEMPERATURE: 97.4 F | SYSTOLIC BLOOD PRESSURE: 154 MMHG | OXYGEN SATURATION: 97 % | BODY MASS INDEX: 38.62 KG/M2 | HEIGHT: 63 IN

## 2022-10-25 DIAGNOSIS — I10 UNCONTROLLED HYPERTENSION: Primary | ICD-10-CM

## 2022-10-25 DIAGNOSIS — I10 ESSENTIAL (PRIMARY) HYPERTENSION: ICD-10-CM

## 2022-10-25 LAB
BUN SERPL-MCNC: 11 MG/DL (ref 7–20)
CALCIUM SERPL-MCNC: 9.2 MG/DL (ref 8.6–10.6)
CHLORIDE SERPL-SCNC: 104 MMOL/L (ref 96–107)
CO2 SERPL-SCNC: 23 MMOL/L (ref 22–32)
CREAT SERPL-MCNC: 0.6 MG/DL (ref 0.5–1.4)
GFR SERPL CREATININE-BSD FRML MDRD: >60 ML/MIN/{1.73M2}
GFR SERPL CREATININE-BSD FRML MDRD: >60 ML/MIN/{1.73M2}
GLUCOSE SERPL-MCNC: 131 MG/DL (ref 70–200)
POTASSIUM SERPL-SCNC: 4.2 MMOL/L (ref 3.5–5.3)
SODIUM SERPL-SCNC: 137 MMOL/L (ref 136–146)

## 2022-10-25 PROCEDURE — 3077F SYST BP >= 140 MM HG: CPT | Performed by: INTERNAL MEDICINE

## 2022-10-25 PROCEDURE — 3080F DIAST BP >= 90 MM HG: CPT | Performed by: INTERNAL MEDICINE

## 2022-10-25 PROCEDURE — 99214 OFFICE O/P EST MOD 30 MIN: CPT | Performed by: INTERNAL MEDICINE

## 2022-10-25 PROCEDURE — 36415 COLL VENOUS BLD VENIPUNCTURE: CPT | Performed by: INTERNAL MEDICINE

## 2022-10-25 PROCEDURE — 80048 BASIC METABOLIC PNL TOTAL CA: CPT | Performed by: INTERNAL MEDICINE

## 2022-10-25 RX ORDER — HYDROCHLOROTHIAZIDE 25 MG/1
25 TABLET ORAL DAILY
Qty: 30 TABLET | Refills: 1 | Status: SHIPPED | OUTPATIENT
Start: 2022-10-25 | End: 2022-12-20

## 2022-10-25 ASSESSMENT — PATIENT HEALTH QUESTIONNAIRE - PHQ9
SUM OF ALL RESPONSES TO PHQ9 QUESTIONS 1 AND 2: 0
SUM OF ALL RESPONSES TO PHQ9 QUESTIONS 1 AND 2: 0
2. FEELING DOWN, DEPRESSED OR HOPELESS: NOT AT ALL
CLINICAL INTERPRETATION OF PHQ2 SCORE: NO FURTHER SCREENING NEEDED
1. LITTLE INTEREST OR PLEASURE IN DOING THINGS: NOT AT ALL

## 2022-11-01 ENCOUNTER — NURSE ONLY (OUTPATIENT)
Dept: FAMILY MEDICINE | Age: 42
End: 2022-11-01

## 2022-11-01 VITALS — DIASTOLIC BLOOD PRESSURE: 90 MMHG | SYSTOLIC BLOOD PRESSURE: 138 MMHG

## 2022-11-01 DIAGNOSIS — I10 ESSENTIAL (PRIMARY) HYPERTENSION: ICD-10-CM

## 2022-11-01 DIAGNOSIS — Z01.30 BP CHECK: Primary | ICD-10-CM

## 2022-11-01 RX ORDER — LISINOPRIL 10 MG/1
20 TABLET ORAL DAILY
Qty: 90 TABLET | Refills: 3 | Status: SHIPPED | COMMUNITY
Start: 2022-11-01 | End: 2022-11-08 | Stop reason: DRUGHIGH

## 2022-11-08 ENCOUNTER — TELEPHONE (OUTPATIENT)
Dept: INTERNAL MEDICINE | Age: 42
End: 2022-11-08

## 2022-11-08 RX ORDER — LISINOPRIL 20 MG/1
20 TABLET ORAL DAILY
Qty: 90 TABLET | Refills: 0 | Status: SHIPPED | OUTPATIENT
Start: 2022-11-08 | End: 2023-02-06

## 2022-11-17 ENCOUNTER — TELEPHONE (OUTPATIENT)
Dept: INTERNAL MEDICINE | Age: 42
End: 2022-11-17

## 2022-11-17 DIAGNOSIS — I10 UNCONTROLLED HYPERTENSION: ICD-10-CM

## 2022-11-17 RX ORDER — HYDROCHLOROTHIAZIDE 25 MG/1
25 TABLET ORAL DAILY
Qty: 30 TABLET | Refills: 1 | Status: CANCELLED | OUTPATIENT
Start: 2022-11-17

## 2022-12-06 ENCOUNTER — APPOINTMENT (OUTPATIENT)
Dept: INTERNAL MEDICINE | Age: 42
End: 2022-12-06

## 2022-12-09 RX ORDER — LISINOPRIL 20 MG/1
20 TABLET ORAL DAILY
Qty: 90 TABLET | Refills: 0 | OUTPATIENT
Start: 2022-12-09

## 2022-12-17 DIAGNOSIS — I10 UNCONTROLLED HYPERTENSION: ICD-10-CM

## 2022-12-20 RX ORDER — HYDROCHLOROTHIAZIDE 25 MG/1
25 TABLET ORAL DAILY
Qty: 30 TABLET | Refills: 1 | Status: SHIPPED | OUTPATIENT
Start: 2022-12-20 | End: 2023-02-27

## 2023-01-05 ENCOUNTER — APPOINTMENT (OUTPATIENT)
Dept: INTERNAL MEDICINE | Age: 43
End: 2023-01-05

## 2023-01-26 ENCOUNTER — LAB SERVICES (OUTPATIENT)
Dept: LAB | Age: 43
End: 2023-01-26

## 2023-01-26 ENCOUNTER — OFFICE VISIT (OUTPATIENT)
Dept: INTERNAL MEDICINE | Age: 43
End: 2023-01-26

## 2023-01-26 VITALS
BODY MASS INDEX: 40.4 KG/M2 | WEIGHT: 228 LBS | OXYGEN SATURATION: 100 % | DIASTOLIC BLOOD PRESSURE: 88 MMHG | SYSTOLIC BLOOD PRESSURE: 128 MMHG | RESPIRATION RATE: 20 BRPM | HEIGHT: 63 IN | HEART RATE: 100 BPM | TEMPERATURE: 97.5 F

## 2023-01-26 DIAGNOSIS — R73.01 IFG (IMPAIRED FASTING GLUCOSE): ICD-10-CM

## 2023-01-26 DIAGNOSIS — E66.01 CLASS 3 SEVERE OBESITY DUE TO EXCESS CALORIES WITHOUT SERIOUS COMORBIDITY WITH BODY MASS INDEX (BMI) OF 40.0 TO 44.9 IN ADULT (CMD): ICD-10-CM

## 2023-01-26 DIAGNOSIS — I10 ESSENTIAL (PRIMARY) HYPERTENSION: Primary | ICD-10-CM

## 2023-01-26 DIAGNOSIS — E78.2 MIXED HYPERLIPIDEMIA: ICD-10-CM

## 2023-01-26 DIAGNOSIS — R07.2 PRECORDIAL PAIN: ICD-10-CM

## 2023-01-26 DIAGNOSIS — I10 ESSENTIAL (PRIMARY) HYPERTENSION: ICD-10-CM

## 2023-01-26 LAB
ALBUMIN SERPL-MCNC: 4.2 G/DL (ref 3.6–5.1)
ALBUMIN/GLOB SERPL: 1.3 {RATIO} (ref 1–2.4)
ALP SERPL-CCNC: 39 UNITS/L (ref 45–117)
ALT SERPL-CCNC: 37 UNITS/L
ANION GAP SERPL CALC-SCNC: 13 MMOL/L (ref 7–19)
AST SERPL-CCNC: 21 UNITS/L
BILIRUB SERPL-MCNC: 0.3 MG/DL (ref 0.2–1)
BUN SERPL-MCNC: 16 MG/DL (ref 6–20)
BUN/CREAT SERPL: 21 (ref 7–25)
CALCIUM SERPL-MCNC: 9.4 MG/DL (ref 8.4–10.2)
CHLORIDE SERPL-SCNC: 101 MMOL/L (ref 97–110)
CHOLEST SERPL-MCNC: 220 MG/DL
CHOLEST/HDLC SERPL: 5.4 {RATIO}
CO2 SERPL-SCNC: 29 MMOL/L (ref 21–32)
CREAT SERPL-MCNC: 0.75 MG/DL (ref 0.51–0.95)
FASTING DURATION TIME PATIENT: ABNORMAL H
FASTING DURATION TIME PATIENT: ABNORMAL H
GFR SERPLBLD BASED ON 1.73 SQ M-ARVRAT: >90 ML/MIN
GLOBULIN SER-MCNC: 3.3 G/DL (ref 2–4)
GLUCOSE SERPL-MCNC: 125 MG/DL (ref 70–99)
HBA1C MFR BLD: 6.3 % (ref 4.5–5.6)
HDLC SERPL-MCNC: 41 MG/DL
LDLC SERPL CALC-MCNC: 149 MG/DL
NONHDLC SERPL-MCNC: 179 MG/DL
POTASSIUM SERPL-SCNC: 4.4 MMOL/L (ref 3.4–5.1)
PROT SERPL-MCNC: 7.5 G/DL (ref 6.4–8.2)
SODIUM SERPL-SCNC: 139 MMOL/L (ref 135–145)
TRIGL SERPL-MCNC: 149 MG/DL
TSH SERPL-ACNC: 1.91 MCUNITS/ML (ref 0.35–5)

## 2023-01-26 PROCEDURE — 3074F SYST BP LT 130 MM HG: CPT | Performed by: INTERNAL MEDICINE

## 2023-01-26 PROCEDURE — 99214 OFFICE O/P EST MOD 30 MIN: CPT | Performed by: INTERNAL MEDICINE

## 2023-01-26 PROCEDURE — 84443 ASSAY THYROID STIM HORMONE: CPT | Performed by: INTERNAL MEDICINE

## 2023-01-26 PROCEDURE — 36415 COLL VENOUS BLD VENIPUNCTURE: CPT | Performed by: INTERNAL MEDICINE

## 2023-01-26 PROCEDURE — 93000 ELECTROCARDIOGRAM COMPLETE: CPT | Performed by: INTERNAL MEDICINE

## 2023-01-26 PROCEDURE — 80061 LIPID PANEL: CPT | Performed by: INTERNAL MEDICINE

## 2023-01-26 PROCEDURE — 83036 HEMOGLOBIN GLYCOSYLATED A1C: CPT | Performed by: INTERNAL MEDICINE

## 2023-01-26 PROCEDURE — 3079F DIAST BP 80-89 MM HG: CPT | Performed by: INTERNAL MEDICINE

## 2023-01-26 PROCEDURE — 80053 COMPREHEN METABOLIC PANEL: CPT | Performed by: INTERNAL MEDICINE

## 2023-01-26 ASSESSMENT — PATIENT HEALTH QUESTIONNAIRE - PHQ9
2. FEELING DOWN, DEPRESSED OR HOPELESS: NOT AT ALL
SUM OF ALL RESPONSES TO PHQ9 QUESTIONS 1 AND 2: 0
1. LITTLE INTEREST OR PLEASURE IN DOING THINGS: NOT AT ALL
SUM OF ALL RESPONSES TO PHQ9 QUESTIONS 1 AND 2: 0
CLINICAL INTERPRETATION OF PHQ2 SCORE: NO FURTHER SCREENING NEEDED

## 2023-01-31 ENCOUNTER — TELEPHONE (OUTPATIENT)
Dept: SCHEDULING | Age: 43
End: 2023-01-31

## 2023-01-31 DIAGNOSIS — E78.00 ELEVATED LDL CHOLESTEROL LEVEL: Primary | ICD-10-CM

## 2023-01-31 RX ORDER — SIMVASTATIN 20 MG
20 TABLET ORAL NIGHTLY
Qty: 30 TABLET | Refills: 1 | Status: SHIPPED | OUTPATIENT
Start: 2023-01-31

## 2023-02-06 ENCOUNTER — TELEPHONE (OUTPATIENT)
Dept: INTERNAL MEDICINE | Age: 43
End: 2023-02-06

## 2023-02-06 RX ORDER — LISINOPRIL 20 MG/1
20 TABLET ORAL DAILY
Qty: 90 TABLET | Refills: 1 | Status: SHIPPED | OUTPATIENT
Start: 2023-02-06

## 2023-02-22 DIAGNOSIS — I10 UNCONTROLLED HYPERTENSION: ICD-10-CM

## 2023-02-27 RX ORDER — HYDROCHLOROTHIAZIDE 25 MG/1
25 TABLET ORAL DAILY
Qty: 30 TABLET | Refills: 1 | Status: SHIPPED | OUTPATIENT
Start: 2023-02-27 | End: 2023-05-02

## 2023-03-06 ENCOUNTER — TELEPHONE (OUTPATIENT)
Dept: INTERNAL MEDICINE | Age: 43
End: 2023-03-06

## 2023-03-14 ENCOUNTER — TELEPHONE (OUTPATIENT)
Dept: CARDIOLOGY | Age: 43
End: 2023-03-14

## 2023-03-15 ENCOUNTER — TELEPHONE (OUTPATIENT)
Dept: CARDIOLOGY | Age: 43
End: 2023-03-15

## 2023-03-21 ENCOUNTER — TELEPHONE (OUTPATIENT)
Dept: CARDIOLOGY | Age: 43
End: 2023-03-21

## 2023-03-21 ENCOUNTER — ANCILLARY PROCEDURE (OUTPATIENT)
Dept: GENERAL RADIOLOGY | Age: 43
End: 2023-03-21
Attending: INTERNAL MEDICINE

## 2023-03-21 ENCOUNTER — ANCILLARY PROCEDURE (OUTPATIENT)
Dept: CARDIOLOGY | Age: 43
End: 2023-03-21
Attending: INTERNAL MEDICINE

## 2023-03-21 DIAGNOSIS — I87.2 VENOUS INSUFFICIENCY: ICD-10-CM

## 2023-03-21 DIAGNOSIS — R07.9 CHEST PAIN AT REST: ICD-10-CM

## 2023-03-21 DIAGNOSIS — I10 ESSENTIAL HYPERTENSION, BENIGN: ICD-10-CM

## 2023-03-21 DIAGNOSIS — I83.812 VARICOSE VEINS OF LEFT LOWER EXTREMITY WITH PAIN: Primary | ICD-10-CM

## 2023-03-21 DIAGNOSIS — E78.2 MIXED HYPERLIPIDEMIA: ICD-10-CM

## 2023-03-21 DIAGNOSIS — R07.2 PRECORDIAL PAIN: ICD-10-CM

## 2023-03-21 DIAGNOSIS — I10 ESSENTIAL (PRIMARY) HYPERTENSION: ICD-10-CM

## 2023-03-21 LAB — STRESS TARGET HR: 176 BPM

## 2023-03-21 PROCEDURE — 93000 ELECTROCARDIOGRAM COMPLETE: CPT | Performed by: INTERNAL MEDICINE

## 2023-03-21 PROCEDURE — 78452 HT MUSCLE IMAGE SPECT MULT: CPT | Performed by: INTERNAL MEDICINE

## 2023-03-21 PROCEDURE — A9500 TC99M SESTAMIBI: HCPCS | Performed by: INTERNAL MEDICINE

## 2023-03-21 PROCEDURE — 93015 CV STRESS TEST SUPVJ I&R: CPT | Performed by: INTERNAL MEDICINE

## 2023-03-21 RX ORDER — TETRAKIS(2-METHOXYISOBUTYLISOCYANIDE)COPPER(I) TETRAFLUOROBORATE 1 MG/ML
30 INJECTION, POWDER, LYOPHILIZED, FOR SOLUTION INTRAVENOUS ONCE
Status: COMPLETED | OUTPATIENT
Start: 2023-03-21 | End: 2023-03-21

## 2023-03-21 RX ORDER — TETRAKIS(2-METHOXYISOBUTYLISOCYANIDE)COPPER(I) TETRAFLUOROBORATE 1 MG/ML
10.5 INJECTION, POWDER, LYOPHILIZED, FOR SOLUTION INTRAVENOUS ONCE
Status: COMPLETED | OUTPATIENT
Start: 2023-03-21 | End: 2023-03-21

## 2023-03-21 RX ADMIN — TETRAKIS(2-METHOXYISOBUTYLISOCYANIDE)COPPER(I) TETRAFLUOROBORATE 10.5 MILLICURIE: 1 INJECTION, POWDER, LYOPHILIZED, FOR SOLUTION INTRAVENOUS at 13:25

## 2023-03-21 RX ADMIN — TETRAKIS(2-METHOXYISOBUTYLISOCYANIDE)COPPER(I) TETRAFLUOROBORATE 29.2 MILLICURIE: 1 INJECTION, POWDER, LYOPHILIZED, FOR SOLUTION INTRAVENOUS at 15:15

## 2023-03-22 ENCOUNTER — ADMINISTRATIVE DOCUMENTATION (OUTPATIENT)
Dept: CARDIOLOGY | Age: 43
End: 2023-03-22

## 2023-04-29 DIAGNOSIS — I10 UNCONTROLLED HYPERTENSION: ICD-10-CM

## 2023-05-02 RX ORDER — HYDROCHLOROTHIAZIDE 25 MG/1
25 TABLET ORAL DAILY
Qty: 30 TABLET | Refills: 1 | Status: SHIPPED | OUTPATIENT
Start: 2023-05-02 | End: 2023-07-07 | Stop reason: SDUPTHER

## 2023-07-07 DIAGNOSIS — I10 UNCONTROLLED HYPERTENSION: ICD-10-CM

## 2023-07-07 RX ORDER — HYDROCHLOROTHIAZIDE 25 MG/1
25 TABLET ORAL DAILY
Qty: 30 TABLET | Refills: 1 | Status: SHIPPED | OUTPATIENT
Start: 2023-07-07

## 2023-08-16 ENCOUNTER — E-ADVICE (OUTPATIENT)
Dept: INTERNAL MEDICINE | Age: 43
End: 2023-08-16

## 2023-09-08 RX ORDER — LISINOPRIL 20 MG/1
20 TABLET ORAL DAILY
Qty: 90 TABLET | Refills: 1 | OUTPATIENT
Start: 2023-09-08

## 2023-12-28 ENCOUNTER — V-VISIT (OUTPATIENT)
Dept: URGENT CARE | Age: 43
End: 2023-12-28

## 2023-12-28 VITALS
RESPIRATION RATE: 16 BRPM | WEIGHT: 220 LBS | HEIGHT: 63 IN | BODY MASS INDEX: 38.98 KG/M2 | DIASTOLIC BLOOD PRESSURE: 74 MMHG | SYSTOLIC BLOOD PRESSURE: 132 MMHG | OXYGEN SATURATION: 98 % | TEMPERATURE: 98.2 F | HEART RATE: 78 BPM

## 2023-12-28 DIAGNOSIS — B00.1 HERPES SIMPLEX LABIALIS: ICD-10-CM

## 2023-12-28 PROCEDURE — 99203 OFFICE O/P NEW LOW 30 MIN: CPT | Performed by: NURSE PRACTITIONER

## 2023-12-28 PROCEDURE — 3075F SYST BP GE 130 - 139MM HG: CPT | Performed by: NURSE PRACTITIONER

## 2023-12-28 PROCEDURE — 3078F DIAST BP <80 MM HG: CPT | Performed by: NURSE PRACTITIONER

## 2023-12-28 RX ORDER — VALACYCLOVIR HYDROCHLORIDE 1 G/1
TABLET, FILM COATED ORAL
Qty: 20 TABLET | Refills: 0 | Status: SHIPPED | OUTPATIENT
Start: 2023-12-28

## 2023-12-28 ASSESSMENT — ENCOUNTER SYMPTOMS
VISUAL CHANGE: 0
ABDOMINAL PAIN: 0
SWOLLEN GLANDS: 0
NEUROLOGICAL NEGATIVE: 1
VOMITING: 0
CHANGE IN BOWEL HABIT: 0
FEVER: 0
RESPIRATORY NEGATIVE: 1
SORE THROAT: 0
FATIGUE: 0
CHILLS: 0
HEMATOLOGIC/LYMPHATIC NEGATIVE: 1
PSYCHIATRIC NEGATIVE: 1
ENDOCRINE NEGATIVE: 1
NUMBNESS: 0
NAUSEA: 0
EYES NEGATIVE: 1
WEAKNESS: 0
VERTIGO: 0
GASTROINTESTINAL NEGATIVE: 1
ALLERGIC/IMMUNOLOGIC NEGATIVE: 1
DIAPHORESIS: 0
CONSTITUTIONAL NEGATIVE: 1
ANOREXIA: 0
HEADACHES: 0
COUGH: 0

## 2023-12-28 ASSESSMENT — PAIN SCALES - GENERAL: PAINLEVEL: 2

## 2024-05-17 ENCOUNTER — WALK IN (OUTPATIENT)
Dept: URGENT CARE | Age: 44
End: 2024-05-17

## 2024-05-17 VITALS
HEART RATE: 99 BPM | RESPIRATION RATE: 18 BRPM | TEMPERATURE: 98.9 F | OXYGEN SATURATION: 99 % | SYSTOLIC BLOOD PRESSURE: 158 MMHG | DIASTOLIC BLOOD PRESSURE: 94 MMHG

## 2024-05-17 DIAGNOSIS — H91.91 DECREASED HEARING OF RIGHT EAR: Primary | ICD-10-CM

## 2024-05-21 ENCOUNTER — APPOINTMENT (OUTPATIENT)
Dept: OTOLARYNGOLOGY | Age: 44
End: 2024-05-21

## 2024-05-21 ENCOUNTER — OFFICE VISIT (OUTPATIENT)
Dept: AUDIOLOGY | Age: 44
End: 2024-05-21
Attending: PHYSICIAN ASSISTANT

## 2024-05-21 VITALS — BODY MASS INDEX: 40.75 KG/M2 | HEIGHT: 63 IN | WEIGHT: 230 LBS

## 2024-05-21 DIAGNOSIS — H93.8X1 EAR PRESSURE, RIGHT: Primary | ICD-10-CM

## 2024-05-21 DIAGNOSIS — H91.91 CHANGE IN HEARING OF RIGHT EAR: Primary | ICD-10-CM

## 2024-05-21 DIAGNOSIS — H93.293 ABNORMAL AUDITORY PERCEPTION OF BOTH EARS: ICD-10-CM

## 2024-05-21 PROCEDURE — 92552 PURE TONE AUDIOMETRY AIR: CPT | Performed by: AUDIOLOGIST

## 2024-05-21 PROCEDURE — 92556 SPEECH AUDIOMETRY COMPLETE: CPT | Performed by: AUDIOLOGIST

## 2024-05-21 PROCEDURE — 99203 OFFICE O/P NEW LOW 30 MIN: CPT | Performed by: PHYSICIAN ASSISTANT

## 2024-07-25 ENCOUNTER — TELEPHONE (OUTPATIENT)
Dept: INTERNAL MEDICINE | Age: 44
End: 2024-07-25

## 2024-07-25 ENCOUNTER — E-ADVICE (OUTPATIENT)
Dept: GASTROENTEROLOGY | Age: 44
End: 2024-07-25

## 2024-07-25 ENCOUNTER — APPOINTMENT (OUTPATIENT)
Dept: INTERNAL MEDICINE | Age: 44
End: 2024-07-25

## 2024-07-25 VITALS
DIASTOLIC BLOOD PRESSURE: 92 MMHG | HEART RATE: 97 BPM | SYSTOLIC BLOOD PRESSURE: 136 MMHG | WEIGHT: 235 LBS | HEIGHT: 63 IN | TEMPERATURE: 97 F | RESPIRATION RATE: 16 BRPM | OXYGEN SATURATION: 99 % | BODY MASS INDEX: 41.64 KG/M2

## 2024-07-25 DIAGNOSIS — Z12.11 SCREEN FOR COLON CANCER: ICD-10-CM

## 2024-07-25 DIAGNOSIS — I10 ESSENTIAL HYPERTENSION, BENIGN: ICD-10-CM

## 2024-07-25 DIAGNOSIS — R06.83 SNORING: ICD-10-CM

## 2024-07-25 DIAGNOSIS — E04.9 THYROID ENLARGED: ICD-10-CM

## 2024-07-25 DIAGNOSIS — Z00.00 ANNUAL PHYSICAL EXAM: Primary | ICD-10-CM

## 2024-07-25 DIAGNOSIS — R73.03 PREDIABETES: ICD-10-CM

## 2024-07-25 DIAGNOSIS — E66.01 CLASS 3 SEVERE OBESITY DUE TO EXCESS CALORIES WITH SERIOUS COMORBIDITY AND BODY MASS INDEX (BMI) OF 40.0 TO 44.9 IN ADULT  (CMD): ICD-10-CM

## 2024-07-25 DIAGNOSIS — Z12.31 ENCOUNTER FOR SCREENING MAMMOGRAM FOR MALIGNANT NEOPLASM OF BREAST: ICD-10-CM

## 2024-07-25 PROCEDURE — 3075F SYST BP GE 130 - 139MM HG: CPT | Performed by: INTERNAL MEDICINE

## 2024-07-25 PROCEDURE — 3080F DIAST BP >= 90 MM HG: CPT | Performed by: INTERNAL MEDICINE

## 2024-07-25 PROCEDURE — 99396 PREV VISIT EST AGE 40-64: CPT | Performed by: INTERNAL MEDICINE

## 2024-07-25 PROCEDURE — 99214 OFFICE O/P EST MOD 30 MIN: CPT | Performed by: INTERNAL MEDICINE

## 2024-07-25 RX ORDER — HYDROCHLOROTHIAZIDE 25 MG/1
25 TABLET ORAL DAILY
Qty: 90 TABLET | Refills: 1 | Status: SHIPPED | OUTPATIENT
Start: 2024-07-25

## 2024-07-25 RX ORDER — LISINOPRIL 20 MG/1
20 TABLET ORAL DAILY
Qty: 90 TABLET | Refills: 1 | Status: SHIPPED | OUTPATIENT
Start: 2024-07-25

## 2024-07-26 ENCOUNTER — TELEPHONE (OUTPATIENT)
Dept: CHRONIC DISEASE MANAGEMENT | Age: 44
End: 2024-07-26

## 2024-07-26 ENCOUNTER — E-ADVICE (OUTPATIENT)
Dept: CHRONIC DISEASE MANAGEMENT | Age: 44
End: 2024-07-26

## 2024-07-26 ENCOUNTER — LAB SERVICES (OUTPATIENT)
Dept: LAB | Age: 44
End: 2024-07-26

## 2024-07-26 DIAGNOSIS — I10 ESSENTIAL HYPERTENSION, BENIGN: ICD-10-CM

## 2024-07-26 DIAGNOSIS — Z00.00 ANNUAL PHYSICAL EXAM: ICD-10-CM

## 2024-07-26 DIAGNOSIS — R73.03 PREDIABETES: ICD-10-CM

## 2024-07-26 LAB
ALBUMIN SERPL-MCNC: 4.5 G/DL (ref 3.6–5.1)
ALBUMIN/GLOB SERPL: 1.4 {RATIO} (ref 1–2.4)
ALP SERPL-CCNC: 52 UNITS/L (ref 45–117)
ALT SERPL-CCNC: 62 UNITS/L
ANION GAP SERPL CALC-SCNC: 17 MMOL/L (ref 7–19)
AST SERPL-CCNC: 40 UNITS/L
BASOPHILS # BLD: 0.1 K/MCL (ref 0–0.3)
BASOPHILS NFR BLD: 1 %
BILIRUB SERPL-MCNC: 0.7 MG/DL (ref 0.2–1)
BUN SERPL-MCNC: 12 MG/DL (ref 6–20)
BUN/CREAT SERPL: 17 (ref 7–25)
CALCIUM SERPL-MCNC: 9.8 MG/DL (ref 8.4–10.2)
CHLORIDE SERPL-SCNC: 97 MMOL/L (ref 97–110)
CHOLEST SERPL-MCNC: 227 MG/DL
CHOLEST/HDLC SERPL: 6 {RATIO}
CO2 SERPL-SCNC: 25 MMOL/L (ref 21–32)
CREAT SERPL-MCNC: 0.69 MG/DL (ref 0.51–0.95)
DEPRECATED RDW RBC: 44.2 FL (ref 39–50)
EGFRCR SERPLBLD CKD-EPI 2021: >90 ML/MIN/{1.73_M2}
EOSINOPHIL # BLD: 0.2 K/MCL (ref 0–0.5)
EOSINOPHIL NFR BLD: 2 %
ERYTHROCYTE [DISTWIDTH] IN BLOOD: 12.6 % (ref 11–15)
FASTING DURATION TIME PATIENT: ABNORMAL H
GLOBULIN SER-MCNC: 3.3 G/DL (ref 2–4)
GLUCOSE SERPL-MCNC: 178 MG/DL (ref 70–99)
HBA1C MFR BLD: 7 % (ref 4.5–5.6)
HCT VFR BLD CALC: 42.1 % (ref 36–46.5)
HDLC SERPL-MCNC: 38 MG/DL
HGB BLD-MCNC: 14.4 G/DL (ref 12–15.5)
IMM GRANULOCYTES # BLD AUTO: 0 K/MCL (ref 0–0.2)
IMM GRANULOCYTES # BLD: 0 %
LDLC SERPL CALC-MCNC: 134 MG/DL
LYMPHOCYTES # BLD: 4 K/MCL (ref 1–4.8)
LYMPHOCYTES NFR BLD: 48 %
MCH RBC QN AUTO: 32.7 PG (ref 26–34)
MCHC RBC AUTO-ENTMCNC: 34.2 G/DL (ref 32–36.5)
MCV RBC AUTO: 95.5 FL (ref 78–100)
MONOCYTES # BLD: 0.7 K/MCL (ref 0.3–0.9)
MONOCYTES NFR BLD: 9 %
NEUTROPHILS # BLD: 3.3 K/MCL (ref 1.8–7.7)
NEUTROPHILS NFR BLD: 40 %
NONHDLC SERPL-MCNC: 189 MG/DL
NRBC BLD MANUAL-RTO: 0 /100 WBC
PLATELET # BLD AUTO: 315 K/MCL (ref 140–450)
POTASSIUM SERPL-SCNC: 4.5 MMOL/L (ref 3.4–5.1)
PROT SERPL-MCNC: 7.8 G/DL (ref 6.4–8.2)
RBC # BLD: 4.41 MIL/MCL (ref 4–5.2)
SODIUM SERPL-SCNC: 134 MMOL/L (ref 135–145)
TRIGL SERPL-MCNC: 273 MG/DL
TSH SERPL-ACNC: 1.79 MCUNITS/ML (ref 0.35–5)
WBC # BLD: 8.3 K/MCL (ref 4.2–11)

## 2024-07-26 PROCEDURE — 80061 LIPID PANEL: CPT | Performed by: INTERNAL MEDICINE

## 2024-07-26 PROCEDURE — 36415 COLL VENOUS BLD VENIPUNCTURE: CPT | Performed by: INTERNAL MEDICINE

## 2024-07-26 PROCEDURE — 83036 HEMOGLOBIN GLYCOSYLATED A1C: CPT | Performed by: INTERNAL MEDICINE

## 2024-07-26 PROCEDURE — 80050 GENERAL HEALTH PANEL: CPT | Performed by: INTERNAL MEDICINE

## 2024-07-30 ENCOUNTER — TELEPHONE (OUTPATIENT)
Dept: INTERNAL MEDICINE | Age: 44
End: 2024-07-30

## 2024-08-15 ENCOUNTER — TELEPHONE (OUTPATIENT)
Dept: NUTRITION | Age: 44
End: 2024-08-15

## 2024-09-03 ENCOUNTER — APPOINTMENT (OUTPATIENT)
Dept: ULTRASOUND IMAGING | Age: 44
End: 2024-09-03
Attending: INTERNAL MEDICINE

## 2024-09-03 ENCOUNTER — APPOINTMENT (OUTPATIENT)
Dept: MAMMOGRAPHY | Age: 44
End: 2024-09-03
Attending: INTERNAL MEDICINE

## 2024-09-03 DIAGNOSIS — Z12.31 ENCOUNTER FOR SCREENING MAMMOGRAM FOR MALIGNANT NEOPLASM OF BREAST: ICD-10-CM

## 2024-09-03 PROCEDURE — 77067 SCR MAMMO BI INCL CAD: CPT | Performed by: STUDENT IN AN ORGANIZED HEALTH CARE EDUCATION/TRAINING PROGRAM

## 2024-09-03 PROCEDURE — 77063 BREAST TOMOSYNTHESIS BI: CPT | Performed by: STUDENT IN AN ORGANIZED HEALTH CARE EDUCATION/TRAINING PROGRAM

## 2024-09-04 ENCOUNTER — TELEPHONE (OUTPATIENT)
Dept: MAMMOGRAPHY | Age: 44
End: 2024-09-04

## 2024-09-13 ENCOUNTER — TELEPHONE (OUTPATIENT)
Dept: INTERNAL MEDICINE | Age: 44
End: 2024-09-13

## 2024-09-16 ENCOUNTER — TELEPHONE (OUTPATIENT)
Dept: GASTROENTEROLOGY | Age: 44
End: 2024-09-16

## 2024-09-24 ENCOUNTER — IMAGING SERVICES (OUTPATIENT)
Dept: GENERAL RADIOLOGY | Age: 44
End: 2024-09-24
Attending: INTERNAL MEDICINE

## 2024-09-24 ENCOUNTER — APPOINTMENT (OUTPATIENT)
Dept: LAB | Age: 44
End: 2024-09-24

## 2024-09-24 DIAGNOSIS — M25.552 LEFT HIP PAIN: ICD-10-CM
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
## 2024-09-24 PROBLEM — N63.0 BREAST NODULE: Status: RESOLVED | Noted: 2022-01-13 | Resolved: 2024-09-24

## 2024-09-24 PROBLEM — R07.9 CHEST PAIN AT REST: Status: RESOLVED | Noted: 2022-01-13 | Resolved: 2024-09-24

## 2024-09-24 PROBLEM — R79.89 POSITIVE D DIMER: Status: RESOLVED | Noted: 2022-01-13 | Resolved: 2024-09-24

## 2024-09-24 PROCEDURE — 73502 X-RAY EXAM HIP UNI 2-3 VIEWS: CPT | Performed by: RADIOLOGY

## 2024-09-30 ENCOUNTER — TELEPHONE (OUTPATIENT)
Dept: INTERNAL MEDICINE | Age: 44
End: 2024-09-30

## 2024-09-30 DIAGNOSIS — E11.9 DIABETES MELLITUS, NEW ONSET (CMD): ICD-10-CM

## 2024-09-30 DIAGNOSIS — E78.5 DYSLIPIDEMIA: ICD-10-CM

## 2024-10-02 ENCOUNTER — TELEPHONE (OUTPATIENT)
Dept: SPORTS MEDICINE | Age: 44
End: 2024-10-02

## 2024-10-02 ENCOUNTER — OFFICE VISIT (OUTPATIENT)
Dept: SPORTS MEDICINE | Age: 44
End: 2024-10-02

## 2024-10-02 VITALS — WEIGHT: 220 LBS | BODY MASS INDEX: 38.98 KG/M2 | HEIGHT: 63 IN

## 2024-10-02 DIAGNOSIS — M25.552 PAIN OF LEFT HIP: ICD-10-CM

## 2024-10-02 DIAGNOSIS — M70.62 GREATER TROCHANTERIC BURSITIS OF LEFT HIP: Primary | ICD-10-CM

## 2024-10-02 DIAGNOSIS — R26.89 LIMPING: ICD-10-CM

## 2024-10-02 PROCEDURE — 99215 OFFICE O/P EST HI 40 MIN: CPT | Performed by: PEDIATRICS

## 2024-10-04 ENCOUNTER — OFFICE VISIT (OUTPATIENT)
Dept: PHYSICAL THERAPY | Age: 44
End: 2024-10-04
Attending: PEDIATRICS

## 2024-10-04 DIAGNOSIS — R26.89 LIMPING: ICD-10-CM

## 2024-10-04 DIAGNOSIS — M25.552 PAIN OF LEFT HIP: ICD-10-CM

## 2024-10-04 DIAGNOSIS — M70.62 GREATER TROCHANTERIC BURSITIS OF LEFT HIP: Primary | ICD-10-CM

## 2024-10-04 PROCEDURE — 97140 MANUAL THERAPY 1/> REGIONS: CPT

## 2024-10-04 PROCEDURE — 97161 PT EVAL LOW COMPLEX 20 MIN: CPT

## 2024-10-04 PROCEDURE — 97112 NEUROMUSCULAR REEDUCATION: CPT

## 2024-10-10 ENCOUNTER — APPOINTMENT (OUTPATIENT)
Dept: PHYSICAL THERAPY | Age: 44
End: 2024-10-10
Attending: PEDIATRICS

## 2024-10-10 DIAGNOSIS — M25.552 LEFT HIP PAIN: Primary | ICD-10-CM

## 2024-10-16 ENCOUNTER — APPOINTMENT (OUTPATIENT)
Dept: MRI IMAGING | Age: 44
End: 2024-10-16
Attending: PEDIATRICS

## 2024-10-16 DIAGNOSIS — M70.62 GREATER TROCHANTERIC BURSITIS OF LEFT HIP: ICD-10-CM

## 2024-10-16 DIAGNOSIS — R26.89 LIMPING: ICD-10-CM

## 2024-10-16 DIAGNOSIS — M25.552 PAIN OF LEFT HIP: ICD-10-CM

## 2024-10-16 PROCEDURE — 73721 MRI JNT OF LWR EXTRE W/O DYE: CPT | Performed by: RADIOLOGY

## 2024-10-17 ENCOUNTER — APPOINTMENT (OUTPATIENT)
Dept: PHYSICAL THERAPY | Age: 44
End: 2024-10-17
Attending: PEDIATRICS

## 2024-10-17 ENCOUNTER — TELEPHONE (OUTPATIENT)
Dept: INTERNAL MEDICINE | Age: 44
End: 2024-10-17

## 2024-10-17 DIAGNOSIS — M25.552 LEFT HIP PAIN: Primary | ICD-10-CM

## 2024-10-18 ENCOUNTER — E-ADVICE (OUTPATIENT)
Dept: SPORTS MEDICINE | Age: 44
End: 2024-10-18

## 2024-10-23 ENCOUNTER — APPOINTMENT (OUTPATIENT)
Dept: PULMONOLOGY | Age: 44
End: 2024-10-23
Attending: INTERNAL MEDICINE

## 2024-10-24 ENCOUNTER — APPOINTMENT (OUTPATIENT)
Dept: PHYSICAL THERAPY | Age: 44
End: 2024-10-24
Attending: PEDIATRICS

## 2024-10-24 DIAGNOSIS — M25.552 LEFT HIP PAIN: Primary | ICD-10-CM

## 2024-10-24 PROCEDURE — 97112 NEUROMUSCULAR REEDUCATION: CPT

## 2024-10-24 PROCEDURE — 97530 THERAPEUTIC ACTIVITIES: CPT

## 2024-10-28 ENCOUNTER — APPOINTMENT (OUTPATIENT)
Dept: MAMMOGRAPHY | Age: 44
End: 2024-10-28
Attending: INTERNAL MEDICINE

## 2024-10-28 ENCOUNTER — APPOINTMENT (OUTPATIENT)
Dept: ULTRASOUND IMAGING | Age: 44
End: 2024-10-28
Attending: INTERNAL MEDICINE

## 2024-10-28 DIAGNOSIS — R92.8 ABNORMAL MAMMOGRAM: ICD-10-CM

## 2024-10-28 DIAGNOSIS — R92.8 ABNORMAL MAMMOGRAM: Primary | ICD-10-CM

## 2024-10-28 PROCEDURE — 76642 ULTRASOUND BREAST LIMITED: CPT | Performed by: RADIOLOGY

## 2024-10-28 PROCEDURE — 77065 DX MAMMO INCL CAD UNI: CPT | Performed by: RADIOLOGY

## 2024-10-28 PROCEDURE — 77061 BREAST TOMOSYNTHESIS UNI: CPT | Performed by: RADIOLOGY

## 2024-10-30 ENCOUNTER — LAB SERVICES (OUTPATIENT)
Dept: LAB | Age: 44
End: 2024-10-30

## 2024-10-30 DIAGNOSIS — E78.5 DYSLIPIDEMIA: ICD-10-CM

## 2024-10-30 DIAGNOSIS — E11.9 DIABETES MELLITUS, NEW ONSET (CMD): ICD-10-CM

## 2024-10-30 LAB
ALBUMIN SERPL-MCNC: 4.5 G/DL (ref 3.4–5)
ALBUMIN/GLOB SERPL: 1.2 {RATIO} (ref 1–2.4)
ALP SERPL-CCNC: 43 UNITS/L (ref 45–117)
ALT SERPL-CCNC: 42 UNITS/L
ANION GAP SERPL CALC-SCNC: 14 MMOL/L (ref 7–19)
AST SERPL-CCNC: 21 UNITS/L
BILIRUB SERPL-MCNC: 0.6 MG/DL (ref 0.2–1)
BUN SERPL-MCNC: 13 MG/DL (ref 6–20)
BUN/CREAT SERPL: 16 (ref 7–25)
CALCIUM SERPL-MCNC: 9.8 MG/DL (ref 8.4–10.2)
CHLORIDE SERPL-SCNC: 96 MMOL/L (ref 97–110)
CHOLEST SERPL-MCNC: 228 MG/DL
CHOLEST/HDLC SERPL: 5.8 {RATIO}
CO2 SERPL-SCNC: 30 MMOL/L (ref 21–32)
CREAT SERPL-MCNC: 0.81 MG/DL (ref 0.51–0.95)
CREAT UR-MCNC: 24.8 MG/DL
EGFRCR SERPLBLD CKD-EPI 2021: >90 ML/MIN/{1.73_M2}
FASTING DURATION TIME PATIENT: ABNORMAL H
GLOBULIN SER-MCNC: 3.7 G/DL (ref 2–4)
GLUCOSE SERPL-MCNC: 93 MG/DL (ref 70–99)
HBA1C MFR BLD: 5.9 % (ref 4.5–5.6)
HDLC SERPL-MCNC: 39 MG/DL
LDLC SERPL CALC-MCNC: 169 MG/DL
MICROALBUMIN UR-MCNC: <0.5 MG/DL
MICROALBUMIN/CREAT UR: NORMAL MG/G{CREAT}
NONHDLC SERPL-MCNC: 189 MG/DL
POTASSIUM SERPL-SCNC: 4.3 MMOL/L (ref 3.4–5.1)
PROT SERPL-MCNC: 8.2 G/DL (ref 6.4–8.2)
SODIUM SERPL-SCNC: 136 MMOL/L (ref 135–145)
TRIGL SERPL-MCNC: 102 MG/DL

## 2024-10-30 PROCEDURE — 82043 UR ALBUMIN QUANTITATIVE: CPT | Performed by: INTERNAL MEDICINE

## 2024-10-30 PROCEDURE — 36415 COLL VENOUS BLD VENIPUNCTURE: CPT | Performed by: INTERNAL MEDICINE

## 2024-10-30 PROCEDURE — 80061 LIPID PANEL: CPT | Performed by: INTERNAL MEDICINE

## 2024-10-30 PROCEDURE — 82570 ASSAY OF URINE CREATININE: CPT | Performed by: INTERNAL MEDICINE

## 2024-10-30 PROCEDURE — 80053 COMPREHEN METABOLIC PANEL: CPT | Performed by: INTERNAL MEDICINE

## 2024-10-30 PROCEDURE — 83036 HEMOGLOBIN GLYCOSYLATED A1C: CPT | Performed by: INTERNAL MEDICINE

## 2024-10-31 ENCOUNTER — APPOINTMENT (OUTPATIENT)
Dept: PHYSICAL THERAPY | Age: 44
End: 2024-10-31
Attending: PEDIATRICS

## 2024-10-31 DIAGNOSIS — M25.552 LEFT HIP PAIN: Primary | ICD-10-CM

## 2024-10-31 ASSESSMENT — MOVEMENT AND STRENGTH ASSESSMENTS
PERFORMING LIGHT ACTIVITES AROUND YOUR HOME: NO DIFFICULTY
ROLLING OVER IN BED: NO DIFFICULTY
WALKING BETWEEN ROOMS: NO DIFFICULTY
RUNNING ON EVEN GROUND: NO DIFFICULTY
WALKING 2 BLOCKS: NO DIFFICULTY
PERFORMING HEAVY ACTIVITIES AROUND YOUR HOME: NO DIFFICULTY
MAKING SHARP TURNS WHILE RUNNING FAST: NO DIFFICULTY
PUTTING ON YOUR SHOES OR SOCKS: NO DIFFICULTY
SQUATTING: NO DIFFICULTY
SITTING FOR 1 HOUR: NO DIFFICULTY
YOUR USUAL HOBBIES, RECREATIONAL OR SPORTING ACTIVIITIES: NO DIFFICULTY
WALKING A MILE: NO DIFFICULTY
STANDING FOR 1 HOUR: NO DIFFICULTY
LIFTING AN OBJECT, LIKE A BAG OF GROCERIES, FROM THE FLOOR: NO DIFFICULTY
TOTAL SCORE: 100
HOPPING: NO DIFFICULTY
GOING UP OR DOWN 10 STAIRS (ABOUT 1 FLIGHT OF STAIRS): NO DIFFICULTY
ANY OF YOUR USUAL WORK, HOUSEWORK OR SCHOOL ACTIVITIES: NO DIFFICULTY
RUNNING ON UNEVEN GROUND: NO DIFFICULTY
GETTING INTO OR OUT OF A CAR: NO DIFFICULTY
GETTING INTO OR OUT OF THE BATH: NO DIFFICULTY

## 2024-11-04 ENCOUNTER — TELEPHONE (OUTPATIENT)
Dept: GENERAL RADIOLOGY | Age: 44
End: 2024-11-04

## 2024-11-04 DIAGNOSIS — E78.2 MIXED HYPERLIPIDEMIA: Primary | ICD-10-CM

## 2024-11-04 DIAGNOSIS — E78.00 ELEVATED LDL CHOLESTEROL LEVEL: ICD-10-CM

## 2024-11-05 ENCOUNTER — E-ADVICE (OUTPATIENT)
Dept: INTERNAL MEDICINE | Age: 44
End: 2024-11-05

## 2024-11-05 ENCOUNTER — APPOINTMENT (OUTPATIENT)
Dept: INTERNAL MEDICINE | Age: 44
End: 2024-11-05

## 2024-11-05 DIAGNOSIS — E78.2 MIXED HYPERLIPIDEMIA: ICD-10-CM

## 2024-11-05 DIAGNOSIS — Z71.84 COUNSELING FOR TRAVEL: Primary | ICD-10-CM

## 2024-11-05 RX ORDER — ATOVAQUONE AND PROGUANIL HYDROCHLORIDE 250; 100 MG/1; MG/1
1 TABLET, FILM COATED ORAL DAILY
Qty: 30 TABLET | Refills: 0 | Status: SHIPPED | OUTPATIENT
Start: 2024-11-05

## 2024-11-05 RX ORDER — SIMVASTATIN 20 MG
20 TABLET ORAL NIGHTLY
Qty: 30 TABLET | Refills: 1 | Status: SHIPPED | OUTPATIENT
Start: 2024-11-05

## 2024-11-12 RX ORDER — SIMVASTATIN 20 MG
20 TABLET ORAL NIGHTLY
Qty: 90 TABLET | Refills: 0 | OUTPATIENT
Start: 2024-11-12

## 2024-12-10 DIAGNOSIS — E78.2 MIXED HYPERLIPIDEMIA: ICD-10-CM

## 2024-12-12 ENCOUNTER — APPOINTMENT (OUTPATIENT)
Dept: GASTROENTEROLOGY | Age: 44
End: 2024-12-12
Attending: STUDENT IN AN ORGANIZED HEALTH CARE EDUCATION/TRAINING PROGRAM

## 2024-12-16 ENCOUNTER — APPOINTMENT (OUTPATIENT)
Dept: PULMONOLOGY | Age: 44
End: 2024-12-16
Attending: INTERNAL MEDICINE

## 2024-12-16 VITALS
WEIGHT: 212 LBS | SYSTOLIC BLOOD PRESSURE: 151 MMHG | HEART RATE: 102 BPM | HEIGHT: 63 IN | RESPIRATION RATE: 16 BRPM | OXYGEN SATURATION: 100 % | DIASTOLIC BLOOD PRESSURE: 86 MMHG | BODY MASS INDEX: 37.56 KG/M2

## 2024-12-16 DIAGNOSIS — R06.83 SNORING: ICD-10-CM

## 2024-12-16 DIAGNOSIS — G47.30 SLEEP APNEA, UNSPECIFIED TYPE: Primary | ICD-10-CM

## 2024-12-16 PROCEDURE — 99244 OFF/OP CNSLTJ NEW/EST MOD 40: CPT | Performed by: INTERNAL MEDICINE

## 2024-12-16 PROCEDURE — 3077F SYST BP >= 140 MM HG: CPT | Performed by: INTERNAL MEDICINE

## 2024-12-16 PROCEDURE — 3079F DIAST BP 80-89 MM HG: CPT | Performed by: INTERNAL MEDICINE

## 2024-12-16 ASSESSMENT — SLEEP AND FATIGUE QUESTIONNAIRES
HOW LIKELY ARE YOU TO NOD OFF OR FALL ASLEEP WHILE LYING DOWN TO REST IN THE AFTERNOON WHEN CIRCUMSTANCES PERMIT: HIGH CHANCE OF DOZING
HOW LIKELY ARE YOU TO NOD OFF OR FALL ASLEEP WHILE SITTING AND TALKING TO SOMEONE: WOULD NEVER DOZE
ESS TOTAL SCORE: 6
HOW LIKELY ARE YOU TO NOD OFF OR FALL ASLEEP WHILE SITTING QUIETLY AFTER LUNCH WITHOUT ALCOHOL: WOULD NEVER DOZE
HOW LIKELY ARE YOU TO NOD OFF OR FALL ASLEEP WHEN YOU ARE A PASSENGER IN A CAR FOR AN HOUR WITHOUT A BREAK: WOULD NEVER DOZE
HOW LIKELY ARE YOU TO NOD OFF OR FALL ASLEEP WHILE WATCHING TV: SLIGHT CHANCE OF DOZING
HOW LIKELY ARE YOU TO NOD OFF OR FALL ASLEEP WHILE SITTING INACTIVE IN A PUBLIC PLACE: WOULD NEVER DOZE
HOW LIKELY ARE YOU TO NOD OFF OR FALL ASLEEP IN A CAR, WHILE STOPPED FOR A FEW MINUTES IN TRAFFIC: WOULD NEVER DOZE
HOW LIKELY ARE YOU TO NOD OFF OR FALL ASLEEP WHILE SITTING AND READING: MODERATE CHANCE OF DOZING

## 2024-12-18 RX ORDER — SIMVASTATIN 20 MG
20 TABLET ORAL NIGHTLY
Qty: 90 TABLET | Refills: 0 | Status: SHIPPED | OUTPATIENT
Start: 2024-12-18

## 2025-01-08 ENCOUNTER — APPOINTMENT (OUTPATIENT)
Dept: INTERNAL MEDICINE | Age: 45
End: 2025-01-08

## 2025-01-10 ENCOUNTER — OFF PREMISE (OUTPATIENT)
Dept: PULMONOLOGY | Age: 45
End: 2025-01-10

## 2025-01-10 DIAGNOSIS — G47.33 OBSTRUCTIVE SLEEP APNEA (ADULT) (PEDIATRIC): Primary | ICD-10-CM

## 2025-01-13 ENCOUNTER — E-ADVICE (OUTPATIENT)
Dept: PULMONOLOGY | Age: 45
End: 2025-01-13

## 2025-01-14 DIAGNOSIS — G47.30 SLEEP APNEA, UNSPECIFIED TYPE: ICD-10-CM

## 2025-01-20 DIAGNOSIS — I10 ESSENTIAL HYPERTENSION, BENIGN: ICD-10-CM

## 2025-01-20 RX ORDER — HYDROCHLOROTHIAZIDE 25 MG/1
25 TABLET ORAL DAILY
Qty: 90 TABLET | Refills: 1 | OUTPATIENT
Start: 2025-01-20

## 2025-01-20 RX ORDER — LISINOPRIL 20 MG/1
20 TABLET ORAL DAILY
Qty: 90 TABLET | Refills: 1 | OUTPATIENT
Start: 2025-01-20

## 2025-01-27 PROBLEM — E78.2 MIXED HYPERLIPIDEMIA: Status: ACTIVE | Noted: 2025-01-27

## 2025-01-27 RX ORDER — ONDANSETRON 4 MG/1
4 TABLET, ORALLY DISINTEGRATING ORAL EVERY 12 HOURS PRN
COMMUNITY
Start: 2024-12-16 | End: 2025-01-28 | Stop reason: ALTCHOICE

## 2025-01-28 ENCOUNTER — APPOINTMENT (OUTPATIENT)
Dept: INTERNAL MEDICINE | Age: 45
End: 2025-01-28

## 2025-01-28 VITALS
SYSTOLIC BLOOD PRESSURE: 130 MMHG | HEIGHT: 63 IN | WEIGHT: 212 LBS | HEART RATE: 90 BPM | RESPIRATION RATE: 18 BRPM | TEMPERATURE: 96.4 F | OXYGEN SATURATION: 98 % | BODY MASS INDEX: 37.56 KG/M2 | DIASTOLIC BLOOD PRESSURE: 80 MMHG

## 2025-01-28 DIAGNOSIS — Z23 NEED FOR PROPHYLACTIC VACCINATION WITH STREPTOCOCCUS PNEUMONIAE (PNEUMOCOCCUS) AND INFLUENZA VACCINES: ICD-10-CM

## 2025-01-28 DIAGNOSIS — E78.5 DYSLIPIDEMIA: ICD-10-CM

## 2025-01-28 DIAGNOSIS — E66.09 CLASS 2 OBESITY DUE TO EXCESS CALORIES WITHOUT SERIOUS COMORBIDITY WITH BODY MASS INDEX (BMI) OF 37.0 TO 37.9 IN ADULT: ICD-10-CM

## 2025-01-28 DIAGNOSIS — E66.812 CLASS 2 OBESITY DUE TO EXCESS CALORIES WITHOUT SERIOUS COMORBIDITY WITH BODY MASS INDEX (BMI) OF 37.0 TO 37.9 IN ADULT: ICD-10-CM

## 2025-01-28 DIAGNOSIS — R73.03 PREDIABETES: ICD-10-CM

## 2025-01-28 DIAGNOSIS — I10 ESSENTIAL HYPERTENSION, BENIGN: Primary | ICD-10-CM

## 2025-01-28 PROCEDURE — 99213 OFFICE O/P EST LOW 20 MIN: CPT | Performed by: INTERNAL MEDICINE

## 2025-01-28 PROCEDURE — 90471 IMMUNIZATION ADMIN: CPT | Performed by: INTERNAL MEDICINE

## 2025-01-28 PROCEDURE — 3079F DIAST BP 80-89 MM HG: CPT | Performed by: INTERNAL MEDICINE

## 2025-01-28 PROCEDURE — 3075F SYST BP GE 130 - 139MM HG: CPT | Performed by: INTERNAL MEDICINE

## 2025-01-28 PROCEDURE — 90677 PCV20 VACCINE IM: CPT | Performed by: INTERNAL MEDICINE

## 2025-01-28 RX ORDER — LISINOPRIL 20 MG/1
20 TABLET ORAL DAILY
Qty: 90 TABLET | Refills: 1 | Status: SHIPPED | OUTPATIENT
Start: 2025-01-28

## 2025-01-28 RX ORDER — HYDROCHLOROTHIAZIDE 25 MG/1
25 TABLET ORAL DAILY
Qty: 90 TABLET | Refills: 1 | Status: SHIPPED | OUTPATIENT
Start: 2025-01-28

## 2025-01-28 ASSESSMENT — PATIENT HEALTH QUESTIONNAIRE - PHQ9
CLINICAL INTERPRETATION OF PHQ2 SCORE: NO FURTHER SCREENING NEEDED
2. FEELING DOWN, DEPRESSED OR HOPELESS: NOT AT ALL
SUM OF ALL RESPONSES TO PHQ9 QUESTIONS 1 AND 2: 0
1. LITTLE INTEREST OR PLEASURE IN DOING THINGS: NOT AT ALL
SUM OF ALL RESPONSES TO PHQ9 QUESTIONS 1 AND 2: 0

## 2025-02-10 DIAGNOSIS — B00.1 HERPES SIMPLEX LABIALIS: ICD-10-CM

## 2025-02-11 RX ORDER — VALACYCLOVIR HYDROCHLORIDE 1 G/1
TABLET, FILM COATED ORAL
Qty: 20 TABLET | Refills: 0 | Status: SHIPPED | OUTPATIENT
Start: 2025-02-11

## 2025-02-20 ENCOUNTER — APPOINTMENT (OUTPATIENT)
Dept: OPTOMETRY | Age: 45
End: 2025-02-20
Attending: INTERNAL MEDICINE

## 2025-02-20 DIAGNOSIS — E11.9 TYPE 2 DIABETES MELLITUS WITHOUT RETINOPATHY  (CMD): Primary | ICD-10-CM

## 2025-02-20 PROCEDURE — 92004 COMPRE OPH EXAM NEW PT 1/>: CPT

## 2025-02-20 ASSESSMENT — EXTERNAL EXAM - LEFT EYE: OS_EXAM: NORMAL

## 2025-02-20 ASSESSMENT — REFRACTION_WEARINGRX
OD_AXIS: 160
OD_SPHERE: -6.00
OS_SPHERE: -6.50
OD_CYLINDER: +1.00
OS_AXIS: 168
OS_CYLINDER: +1.00

## 2025-02-20 ASSESSMENT — SLIT LAMP EXAM - LIDS
COMMENTS: NORMAL
COMMENTS: NORMAL

## 2025-02-20 ASSESSMENT — VISUAL ACUITY
METHOD_MR: DECLINED REFRACTION
OD_CC: J2
METHOD: SNELLEN - LINEAR
OD_CC: 20/25
OS_CC: 20/20
OS_CC: J2
CORRECTION_TYPE: GLASSES

## 2025-02-20 ASSESSMENT — CONF VISUAL FIELD
OS_INFERIOR_TEMPORAL_RESTRICTION: 0
OS_SUPERIOR_TEMPORAL_RESTRICTION: 0
OD_SUPERIOR_TEMPORAL_RESTRICTION: 0
OS_INFERIOR_NASAL_RESTRICTION: 0
OS_SUPERIOR_NASAL_RESTRICTION: 0
OS_NORMAL: 1
OD_SUPERIOR_NASAL_RESTRICTION: 0
OD_INFERIOR_TEMPORAL_RESTRICTION: 0
OD_NORMAL: 1
OD_INFERIOR_NASAL_RESTRICTION: 0

## 2025-02-20 ASSESSMENT — TONOMETRY
OD_IOP_MMHG: 18
OS_IOP_MMHG: 15
IOP_METHOD: ICARE

## 2025-02-20 ASSESSMENT — CUP TO DISC RATIO
OS_RATIO: 0.3
OD_RATIO: 0.3

## 2025-02-20 ASSESSMENT — EXTERNAL EXAM - RIGHT EYE: OD_EXAM: NORMAL

## 2025-03-18 ENCOUNTER — TELEPHONE (OUTPATIENT)
Dept: INTERNAL MEDICINE | Age: 45
End: 2025-03-18

## 2025-03-18 DIAGNOSIS — Z71.84 TRAVEL ADVICE ENCOUNTER: Primary | ICD-10-CM

## 2025-03-18 DIAGNOSIS — E78.2 MIXED HYPERLIPIDEMIA: ICD-10-CM

## 2025-03-18 RX ORDER — ATOVAQUONE AND PROGUANIL HYDROCHLORIDE 250; 100 MG/1; MG/1
1 TABLET, FILM COATED ORAL DAILY
Qty: 39 TABLET | Refills: 0 | Status: SHIPPED | OUTPATIENT
Start: 2025-03-18

## 2025-03-20 RX ORDER — SIMVASTATIN 20 MG
20 TABLET ORAL NIGHTLY
Qty: 90 TABLET | Refills: 1 | Status: SHIPPED | OUTPATIENT
Start: 2025-03-20

## 2025-04-25 ENCOUNTER — APPOINTMENT (OUTPATIENT)
Dept: ULTRASOUND IMAGING | Age: 45
End: 2025-04-25
Attending: INTERNAL MEDICINE

## 2025-07-26 ENCOUNTER — APPOINTMENT (OUTPATIENT)
Dept: INTERNAL MEDICINE | Age: 45
End: 2025-07-26

## 2026-03-14 ENCOUNTER — APPOINTMENT (OUTPATIENT)
Dept: OPTOMETRY | Age: 46
End: 2026-03-14